# Patient Record
Sex: FEMALE | Race: WHITE | NOT HISPANIC OR LATINO | Employment: OTHER | URBAN - METROPOLITAN AREA
[De-identification: names, ages, dates, MRNs, and addresses within clinical notes are randomized per-mention and may not be internally consistent; named-entity substitution may affect disease eponyms.]

---

## 2023-09-06 PROBLEM — Z86.39 HISTORY OF LOW POTASSIUM: Status: ACTIVE | Noted: 2023-09-06

## 2023-09-06 PROBLEM — E03.9 HYPOTHYROIDISM: Status: ACTIVE | Noted: 2023-09-06

## 2023-09-06 PROBLEM — H04.129 TEAR FILM INSUFFICIENCY: Status: ACTIVE | Noted: 2023-09-06

## 2023-09-06 PROBLEM — D50.9 IRON DEFICIENCY ANEMIA: Status: ACTIVE | Noted: 2023-09-06

## 2023-09-06 PROBLEM — J45.20 MILD INTERMITTENT ASTHMA WITHOUT COMPLICATION (HHS-HCC): Status: ACTIVE | Noted: 2023-09-06

## 2023-09-06 PROBLEM — Z85.828 HISTORY OF BASAL CELL CARCINOMA: Status: ACTIVE | Noted: 2023-09-06

## 2023-09-06 PROBLEM — H25.10 NUCLEAR SENILE CATARACT: Status: ACTIVE | Noted: 2023-09-06

## 2023-09-06 PROBLEM — I20.89 ANGINA AT REST (CMS-HCC): Status: ACTIVE | Noted: 2023-09-06

## 2023-09-06 PROBLEM — Z86.0101 HISTORY OF ADENOMATOUS POLYP OF COLON: Status: ACTIVE | Noted: 2023-09-06

## 2023-09-06 PROBLEM — S09.90XA INJURY OF HEAD: Status: ACTIVE | Noted: 2023-09-06

## 2023-09-06 PROBLEM — J45.909 ASTHMA (HHS-HCC): Status: ACTIVE | Noted: 2023-09-06

## 2023-09-06 PROBLEM — G47.30 MILD SLEEP APNEA: Status: ACTIVE | Noted: 2023-09-06

## 2023-09-06 PROBLEM — H52.7 UNSPECIFIED DISORDER OF REFRACTION: Status: ACTIVE | Noted: 2023-09-06

## 2023-09-06 PROBLEM — S79.919A HIP INJURY: Status: ACTIVE | Noted: 2023-09-06

## 2023-09-06 PROBLEM — K14.8 TONGUE LESION: Status: ACTIVE | Noted: 2023-09-06

## 2023-09-06 PROBLEM — E04.1 NODULAR THYROID DISEASE: Status: ACTIVE | Noted: 2023-09-06

## 2023-09-06 PROBLEM — J33.9 NASAL POLYPS: Status: ACTIVE | Noted: 2023-09-06

## 2023-09-06 PROBLEM — F41.9 ANXIETY DISORDER, UNSPECIFIED: Status: ACTIVE | Noted: 2023-09-06

## 2023-09-06 PROBLEM — R40.0 DAYTIME SOMNOLENCE: Status: ACTIVE | Noted: 2023-09-06

## 2023-09-06 PROBLEM — M17.12 OSTEOARTHRITIS OF LEFT KNEE: Status: ACTIVE | Noted: 2023-09-06

## 2023-09-06 PROBLEM — Z86.010 HISTORY OF ADENOMATOUS POLYP OF COLON: Status: ACTIVE | Noted: 2023-09-06
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
## 2023-09-06 PROBLEM — E11.9 TYPE 2 DIABETES MELLITUS WITHOUT COMPLICATION, WITHOUT LONG-TERM CURRENT USE OF INSULIN (MULTI): Status: ACTIVE | Noted: 2023-09-06

## 2023-09-06 PROBLEM — R06.81 APNEIC EPISODE: Status: ACTIVE | Noted: 2023-09-06

## 2023-09-06 PROBLEM — G47.33 OBSTRUCTIVE SLEEP APNEA SYNDROME: Status: ACTIVE | Noted: 2023-09-06

## 2023-09-06 PROBLEM — E11.9 ABNORMAL METABOLIC STATE DUE TO DIABETES MELLITUS (MULTI): Status: ACTIVE | Noted: 2023-09-06

## 2023-09-06 PROBLEM — M62.838 MUSCLE SPASM: Status: ACTIVE | Noted: 2023-09-06

## 2023-09-06 PROBLEM — I10 HYPERTENSIVE DISORDER: Status: ACTIVE | Noted: 2023-09-06

## 2023-09-06 PROBLEM — K57.30 DIVERTICULOSIS LARGE INTESTINE W/O PERFORATION OR ABSCESS W/O BLEEDING: Status: ACTIVE | Noted: 2023-09-06

## 2023-09-06 PROBLEM — R06.83 LOUD SNORING: Status: ACTIVE | Noted: 2023-09-06

## 2023-09-06 PROBLEM — D62 ANEMIA DUE TO ACUTE BLOOD LOSS: Status: ACTIVE | Noted: 2023-09-06

## 2023-09-06 PROBLEM — S72.009A FRACTURE OF NECK OF FEMUR (MULTI): Status: ACTIVE | Noted: 2023-09-06

## 2023-09-06 PROBLEM — G62.9 NEUROPATHY: Status: ACTIVE | Noted: 2023-09-06

## 2023-09-06 PROBLEM — R00.2 PALPITATIONS: Status: ACTIVE | Noted: 2023-09-06

## 2023-09-06 PROBLEM — I50.9 HEART FAILURE (MULTI): Status: ACTIVE | Noted: 2023-09-06

## 2023-09-06 PROBLEM — F32.9 MAJOR DEPRESSIVE DISORDER, SINGLE EPISODE, UNSPECIFIED: Status: ACTIVE | Noted: 2023-09-06

## 2023-09-06 PROBLEM — E66.01 SEVERE OBESITY (BMI >= 40) (MULTI): Status: ACTIVE | Noted: 2023-09-06

## 2023-09-06 PROBLEM — H35.369 RETINAL DRUSEN: Status: ACTIVE | Noted: 2023-09-06

## 2023-09-06 PROBLEM — R04.0 EPISTAXIS: Status: ACTIVE | Noted: 2023-09-06

## 2023-09-06 PROBLEM — G89.29 OTHER CHRONIC PAIN: Status: ACTIVE | Noted: 2023-09-06

## 2023-09-06 PROBLEM — M25.519 SHOULDER PAIN: Status: ACTIVE | Noted: 2023-09-06

## 2023-09-06 PROBLEM — R49.0 HOARSENESS: Status: ACTIVE | Noted: 2023-09-06

## 2023-09-06 RX ORDER — ACETAMINOPHEN 325 MG/1
325 TABLET ORAL EVERY 4 HOURS
COMMUNITY

## 2023-09-06 RX ORDER — LANOLIN ALCOHOL/MO/W.PET/CERES
1000 CREAM (GRAM) TOPICAL DAILY
COMMUNITY
End: 2023-10-18 | Stop reason: ALTCHOICE

## 2023-09-06 RX ORDER — TORSEMIDE 100 MG/1
0.5 TABLET ORAL DAILY
COMMUNITY
End: 2024-01-12 | Stop reason: SDUPTHER

## 2023-09-06 RX ORDER — METOPROLOL SUCCINATE 25 MG/1
25 TABLET, EXTENDED RELEASE ORAL NIGHTLY
COMMUNITY
Start: 2020-08-11 | End: 2023-10-18 | Stop reason: ALTCHOICE

## 2023-09-06 RX ORDER — LEVOTHYROXINE SODIUM 150 UG/1
150 TABLET ORAL
COMMUNITY
End: 2024-03-05

## 2023-09-06 RX ORDER — GABAPENTIN 600 MG/1
600 TABLET ORAL 2 TIMES DAILY
COMMUNITY
End: 2024-05-16 | Stop reason: SDUPTHER

## 2023-09-06 RX ORDER — FLUTICASONE PROPIONATE 50 MCG
2 SPRAY, SUSPENSION (ML) NASAL DAILY
COMMUNITY
End: 2024-03-06

## 2023-09-06 RX ORDER — FLUTICASONE PROPIONATE 110 UG/1
AEROSOL, METERED RESPIRATORY (INHALATION)
COMMUNITY
End: 2023-10-18

## 2023-09-06 RX ORDER — ALBUTEROL SULFATE 90 UG/1
AEROSOL, METERED RESPIRATORY (INHALATION)
COMMUNITY
End: 2023-10-18

## 2023-09-06 RX ORDER — SPIRONOLACTONE 25 MG/1
0.5 TABLET ORAL 2 TIMES DAILY
COMMUNITY
End: 2024-04-11

## 2023-09-06 RX ORDER — ASPIRIN 325 MG
500 TABLET, DELAYED RELEASE (ENTERIC COATED) ORAL 2 TIMES DAILY
COMMUNITY
End: 2023-10-18 | Stop reason: ALTCHOICE

## 2023-09-06 RX ORDER — METFORMIN HYDROCHLORIDE 500 MG/1
500 TABLET ORAL DAILY
COMMUNITY
End: 2024-04-11

## 2023-09-06 RX ORDER — FLUOXETINE HYDROCHLORIDE 40 MG/1
40 CAPSULE ORAL DAILY
COMMUNITY
End: 2024-03-21

## 2023-09-06 RX ORDER — CHOLECALCIFEROL (VITAMIN D3) 25 MCG
25 TABLET ORAL DAILY
COMMUNITY

## 2023-09-06 RX ORDER — DICLOFENAC SODIUM 10 MG/G
GEL TOPICAL DAILY
COMMUNITY
End: 2024-01-12 | Stop reason: WASHOUT

## 2023-09-06 RX ORDER — BACLOFEN 20 MG
1 TABLET ORAL DAILY
COMMUNITY
End: 2024-01-12 | Stop reason: WASHOUT

## 2023-09-06 RX ORDER — PHENYLEPHRINE HCL 10 MG
TABLET ORAL
COMMUNITY
End: 2023-10-18 | Stop reason: ALTCHOICE

## 2023-10-16 DIAGNOSIS — J45.909 ASTHMA, UNSPECIFIED ASTHMA SEVERITY, UNSPECIFIED WHETHER COMPLICATED, UNSPECIFIED WHETHER PERSISTENT (HHS-HCC): ICD-10-CM

## 2023-10-18 ENCOUNTER — OFFICE VISIT (OUTPATIENT)
Dept: PRIMARY CARE | Facility: CLINIC | Age: 68
End: 2023-10-18
Payer: MEDICARE

## 2023-10-18 VITALS
RESPIRATION RATE: 18 BRPM | DIASTOLIC BLOOD PRESSURE: 68 MMHG | WEIGHT: 267 LBS | HEART RATE: 62 BPM | OXYGEN SATURATION: 99 % | SYSTOLIC BLOOD PRESSURE: 124 MMHG | BODY MASS INDEX: 44.48 KG/M2 | TEMPERATURE: 97.6 F | HEIGHT: 65 IN

## 2023-10-18 DIAGNOSIS — S90.852A FOREIGN BODY IN LEFT FOOT, INITIAL ENCOUNTER: Primary | ICD-10-CM

## 2023-10-18 PROCEDURE — 3044F HG A1C LEVEL LT 7.0%: CPT | Performed by: NURSE PRACTITIONER

## 2023-10-18 PROCEDURE — 1125F AMNT PAIN NOTED PAIN PRSNT: CPT | Performed by: NURSE PRACTITIONER

## 2023-10-18 PROCEDURE — 99213 OFFICE O/P EST LOW 20 MIN: CPT | Performed by: NURSE PRACTITIONER

## 2023-10-18 PROCEDURE — 1159F MED LIST DOCD IN RCRD: CPT | Performed by: NURSE PRACTITIONER

## 2023-10-18 PROCEDURE — 3074F SYST BP LT 130 MM HG: CPT | Performed by: NURSE PRACTITIONER

## 2023-10-18 PROCEDURE — 10120 INC&RMVL FB SUBQ TISS SMPL: CPT | Performed by: NURSE PRACTITIONER

## 2023-10-18 PROCEDURE — 1036F TOBACCO NON-USER: CPT | Performed by: NURSE PRACTITIONER

## 2023-10-18 PROCEDURE — 99213 OFFICE O/P EST LOW 20 MIN: CPT | Mod: 25 | Performed by: NURSE PRACTITIONER

## 2023-10-18 PROCEDURE — 3078F DIAST BP <80 MM HG: CPT | Performed by: NURSE PRACTITIONER

## 2023-10-18 RX ORDER — DEXAMETHASONE 4 MG/1
TABLET ORAL
Qty: 1 G | Refills: 5 | Status: SHIPPED | OUTPATIENT
Start: 2023-10-18 | End: 2024-04-16

## 2023-10-18 RX ORDER — ALBUTEROL SULFATE 90 UG/1
AEROSOL, METERED RESPIRATORY (INHALATION)
Qty: 1 G | Refills: 5 | Status: SHIPPED | OUTPATIENT
Start: 2023-10-18 | End: 2024-05-16 | Stop reason: SDUPTHER

## 2023-10-18 RX ORDER — AMOXICILLIN AND CLAVULANATE POTASSIUM 875; 125 MG/1; MG/1
1 TABLET, FILM COATED ORAL EVERY 12 HOURS
COMMUNITY
Start: 2023-10-11 | End: 2024-01-12 | Stop reason: WASHOUT

## 2023-10-18 RX ORDER — SULFAMETHOXAZOLE AND TRIMETHOPRIM 800; 160 MG/1; MG/1
1 TABLET ORAL 2 TIMES DAILY
Qty: 10 TABLET | Refills: 0 | Status: SHIPPED | OUTPATIENT
Start: 2023-10-18 | End: 2023-10-23

## 2023-10-18 ASSESSMENT — ENCOUNTER SYMPTOMS
RESPIRATORY NEGATIVE: 1
LOSS OF SENSATION IN FEET: 0
LOSS OF SENSATION: 0
OCCASIONAL FEELINGS OF UNSTEADINESS: 1
TINGLING: 0
CONSTITUTIONAL NEGATIVE: 1
NUMBNESS: 0
CARDIOVASCULAR NEGATIVE: 1
DEPRESSION: 0
INABILITY TO BEAR WEIGHT: 0

## 2023-10-18 ASSESSMENT — PAIN SCALES - GENERAL: PAINLEVEL: 2

## 2023-10-18 NOTE — TELEPHONE ENCOUNTER
Rx Request received.   Rx needed Ventolin and Flovent inhalers  Pharmacy Populated  Last appointment 6 month follow up 7/12/23. Has upcoming physical 1/12/24  PCP Dee Choudhary, BERTRAND OCHOA on 10/18/23 at 1:23 PM.

## 2023-10-18 NOTE — PROGRESS NOTES
"  Subjective   Patient ID: Radha Redmond is a 68 y.o. female who presents for Foot Injury (Sore on the bottom of left foot, not healing, some bleeding today./Pt thought it was a callus at first, noticed it early last week, has applied callus removing gel/oint, but soon after it started to ooze yellowish discharge mixed with blood. Pt is on amoxicillin from dentist due to tooth implant).    Foot Injury   The incident occurred 5 to 7 days ago. The incident occurred at home. There was no injury mechanism. The pain is present in the left foot. The quality of the pain is described as burning. The pain is at a severity of 2/10. The pain is mild. The pain has been Fluctuating since onset. Pertinent negatives include no inability to bear weight, loss of sensation, numbness or tingling. She reports no foreign bodies present. The symptoms are aggravated by weight bearing. Treatments tried: bandage and atb ointment. The treatment provided mild relief.        Review of Systems   Constitutional: Negative.    HENT: Negative.     Respiratory: Negative.     Cardiovascular: Negative.    Skin:         Open wound left palmar foot    Neurological:  Negative for tingling and numbness.       Objective   /68 (BP Location: Right arm, Patient Position: Sitting, BP Cuff Size: Adult)   Pulse 62   Temp 36.4 °C (97.6 °F) (Temporal)   Resp 18   Ht 1.638 m (5' 4.5\")   Wt 121 kg (267 lb)   SpO2 99%   BMI 45.12 kg/m²     Physical Exam  Vitals and nursing note reviewed.   Constitutional:       Appearance: Normal appearance.   Cardiovascular:      Rate and Rhythm: Normal rate and regular rhythm.   Pulmonary:      Effort: Pulmonary effort is normal.      Breath sounds: Normal breath sounds.   Skin:     Comments: Sore on dorsal aspect of left foot, with foreign body.  Area cleaned with betadine, and alcohol, small white glass shard removed with needle and grabber tweezers, pt tolerated well.  Recleaned area and applied bacitracin and " bandage.    Neurological:      Mental Status: She is alert.         Assessment/Plan   Diagnoses and all orders for this visit:  Foreign body in left foot, initial encounter  -     sulfamethoxazole-trimethoprim (Bactrim DS) 800-160 mg tablet; Take 1 tablet by mouth 2 times a day for 5 days.

## 2023-10-18 NOTE — PATIENT INSTRUCTIONS
Clean with soap and water twice a day, pat dry, apply neosporin and bandage.    Take antibiotic till gone

## 2024-01-02 ENCOUNTER — LAB (OUTPATIENT)
Dept: LAB | Facility: LAB | Age: 69
End: 2024-01-02
Payer: MEDICARE

## 2024-01-02 DIAGNOSIS — N18.30 CHRONIC KIDNEY DISEASE, STAGE 3 UNSPECIFIED (MULTI): Primary | ICD-10-CM

## 2024-01-02 LAB
ALBUMIN SERPL-MCNC: 4 G/DL (ref 3.5–5)
ANION GAP SERPL CALC-SCNC: 12 MMOL/L
BUN SERPL-MCNC: 25 MG/DL (ref 8–25)
CALCIUM SERPL-MCNC: 9 MG/DL (ref 8.5–10.4)
CHLORIDE SERPL-SCNC: 100 MMOL/L (ref 97–107)
CO2 SERPL-SCNC: 30 MMOL/L (ref 24–31)
CREAT SERPL-MCNC: 1.1 MG/DL (ref 0.4–1.6)
ERYTHROCYTE [DISTWIDTH] IN BLOOD BY AUTOMATED COUNT: 12.7 % (ref 11.5–14.5)
GFR SERPL CREATININE-BSD FRML MDRD: 55 ML/MIN/1.73M*2
GLUCOSE SERPL-MCNC: 117 MG/DL (ref 65–99)
HCT VFR BLD AUTO: 41 % (ref 36–46)
HGB BLD-MCNC: 13 G/DL (ref 12–16)
MCH RBC QN AUTO: 29.1 PG (ref 26–34)
MCHC RBC AUTO-ENTMCNC: 31.7 G/DL (ref 32–36)
MCV RBC AUTO: 92 FL (ref 80–100)
NRBC BLD-RTO: 0 /100 WBCS (ref 0–0)
PHOSPHATE SERPL-MCNC: 4.5 MG/DL (ref 2.5–4.5)
PLATELET # BLD AUTO: 331 X10*3/UL (ref 150–450)
POTASSIUM SERPL-SCNC: 4.2 MMOL/L (ref 3.4–5.1)
PTH-INTACT SERPL-MCNC: 111.6 PG/ML (ref 18.5–88)
RBC # BLD AUTO: 4.46 X10*6/UL (ref 4–5.2)
SODIUM SERPL-SCNC: 142 MMOL/L (ref 133–145)
WBC # BLD AUTO: 6 X10*3/UL (ref 4.4–11.3)

## 2024-01-02 PROCEDURE — 36415 COLL VENOUS BLD VENIPUNCTURE: CPT

## 2024-01-02 PROCEDURE — 83970 ASSAY OF PARATHORMONE: CPT

## 2024-01-02 PROCEDURE — 85027 COMPLETE CBC AUTOMATED: CPT

## 2024-01-02 PROCEDURE — 80069 RENAL FUNCTION PANEL: CPT

## 2024-01-12 ENCOUNTER — OFFICE VISIT (OUTPATIENT)
Dept: PRIMARY CARE | Facility: CLINIC | Age: 69
End: 2024-01-12
Payer: MEDICARE

## 2024-01-12 VITALS
SYSTOLIC BLOOD PRESSURE: 136 MMHG | HEART RATE: 68 BPM | OXYGEN SATURATION: 96 % | DIASTOLIC BLOOD PRESSURE: 62 MMHG | HEIGHT: 65 IN | TEMPERATURE: 96.6 F | WEIGHT: 268.7 LBS | RESPIRATION RATE: 18 BRPM | BODY MASS INDEX: 44.77 KG/M2

## 2024-01-12 DIAGNOSIS — Z00.00 ROUTINE GENERAL MEDICAL EXAMINATION AT HEALTH CARE FACILITY: Primary | ICD-10-CM

## 2024-01-12 DIAGNOSIS — Z12.31 ENCOUNTER FOR SCREENING MAMMOGRAM FOR BREAST CANCER: ICD-10-CM

## 2024-01-12 DIAGNOSIS — E66.01 MORBID OBESITY WITH BMI OF 45.0-49.9, ADULT (MULTI): ICD-10-CM

## 2024-01-12 DIAGNOSIS — E55.9 VITAMIN D DEFICIENCY: ICD-10-CM

## 2024-01-12 DIAGNOSIS — E11.9 TYPE 2 DIABETES MELLITUS WITHOUT COMPLICATION, WITHOUT LONG-TERM CURRENT USE OF INSULIN (MULTI): ICD-10-CM

## 2024-01-12 DIAGNOSIS — E04.1 NODULAR THYROID DISEASE: ICD-10-CM

## 2024-01-12 DIAGNOSIS — I10 BENIGN ESSENTIAL HTN: ICD-10-CM

## 2024-01-12 DIAGNOSIS — Z13.6 SCREENING FOR CARDIOVASCULAR CONDITION: ICD-10-CM

## 2024-01-12 PROBLEM — S79.919A HIP INJURY: Status: RESOLVED | Noted: 2023-09-06 | Resolved: 2024-01-12

## 2024-01-12 PROBLEM — R06.81 APNEIC EPISODE: Status: RESOLVED | Noted: 2023-09-06 | Resolved: 2024-01-12

## 2024-01-12 PROBLEM — J45.909 ASTHMA (HHS-HCC): Status: RESOLVED | Noted: 2023-09-06 | Resolved: 2024-01-12

## 2024-01-12 PROBLEM — G47.30 MILD SLEEP APNEA: Status: RESOLVED | Noted: 2023-09-06 | Resolved: 2024-01-12
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
## 2024-01-12 PROBLEM — S09.90XA INJURY OF HEAD: Status: RESOLVED | Noted: 2023-09-06 | Resolved: 2024-01-12

## 2024-01-12 PROBLEM — S72.009A FRACTURE OF NECK OF FEMUR (MULTI): Status: RESOLVED | Noted: 2023-09-06 | Resolved: 2024-01-12

## 2024-01-12 PROCEDURE — 1170F FXNL STATUS ASSESSED: CPT | Performed by: NURSE PRACTITIONER

## 2024-01-12 PROCEDURE — 1125F AMNT PAIN NOTED PAIN PRSNT: CPT | Performed by: NURSE PRACTITIONER

## 2024-01-12 PROCEDURE — 1157F ADVNC CARE PLAN IN RCRD: CPT | Performed by: NURSE PRACTITIONER

## 2024-01-12 PROCEDURE — 3075F SYST BP GE 130 - 139MM HG: CPT | Performed by: NURSE PRACTITIONER

## 2024-01-12 PROCEDURE — 1159F MED LIST DOCD IN RCRD: CPT | Performed by: NURSE PRACTITIONER

## 2024-01-12 PROCEDURE — 1036F TOBACCO NON-USER: CPT | Performed by: NURSE PRACTITIONER

## 2024-01-12 PROCEDURE — 3008F BODY MASS INDEX DOCD: CPT | Performed by: NURSE PRACTITIONER

## 2024-01-12 PROCEDURE — 3078F DIAST BP <80 MM HG: CPT | Performed by: NURSE PRACTITIONER

## 2024-01-12 PROCEDURE — G0439 PPPS, SUBSEQ VISIT: HCPCS | Performed by: NURSE PRACTITIONER

## 2024-01-12 PROCEDURE — 99215 OFFICE O/P EST HI 40 MIN: CPT | Performed by: NURSE PRACTITIONER

## 2024-01-12 RX ORDER — DAPAGLIFLOZIN 10 MG/1
10 TABLET, FILM COATED ORAL DAILY
COMMUNITY

## 2024-01-12 RX ORDER — TORSEMIDE 100 MG/1
50 TABLET ORAL DAILY
Qty: 45 TABLET | Refills: 1 | Status: SHIPPED | OUTPATIENT
Start: 2024-01-12 | End: 2024-07-10

## 2024-01-12 ASSESSMENT — ACTIVITIES OF DAILY LIVING (ADL)
USING TRANSPORTATION: INDEPENDENT
DRESSING YOURSELF: INDEPENDENT
GROCERY SHOPPING: INDEPENDENT
ADEQUATE_TO_COMPLETE_ADL: YES
EATING: INDEPENDENT
DRESSING: INDEPENDENT
PATIENT'S MEMORY ADEQUATE TO SAFELY COMPLETE DAILY ACTIVITIES?: YES
GROOMING: INDEPENDENT
NEEDS ASSISTANCE WITH FOOD: INDEPENDENT
JUDGMENT_ADEQUATE_SAFELY_COMPLETE_DAILY_ACTIVITIES: YES
FEEDING YOURSELF: INDEPENDENT
ADEQUATE_TO_COMPLETE_ADL: YES
WALKS IN HOME: NEEDS ASSISTANCE
FEEDING: INDEPENDENT
HEARING - LEFT EAR: FUNCTIONAL
DOING HOUSEWORK: INDEPENDENT
PILL BOX USED: YES
STIL DRIVING: NO
JUDGMENT_ADEQUATE_SAFELY_COMPLETE_DAILY_ACTIVITIES: YES
PREPARING MEALS: INDEPENDENT
TAKING MEDICATION: INDEPENDENT
MANAGING FINANCES: INDEPENDENT
BATHING: INDEPENDENT
HEARING - RIGHT EAR: FUNCTIONAL
USING TELEPHONE: INDEPENDENT
TOILETING: INDEPENDENT
BATHING: INDEPENDENT

## 2024-01-12 ASSESSMENT — ENCOUNTER SYMPTOMS
CARDIOVASCULAR NEGATIVE: 1
ENDOCRINE NEGATIVE: 1
EYES NEGATIVE: 1
MUSCULOSKELETAL NEGATIVE: 1
ALLERGIC/IMMUNOLOGIC NEGATIVE: 1
GASTROINTESTINAL NEGATIVE: 1
NEUROLOGICAL NEGATIVE: 1
RESPIRATORY NEGATIVE: 1
HEMATOLOGIC/LYMPHATIC NEGATIVE: 1
PSYCHIATRIC NEGATIVE: 1
CONSTITUTIONAL NEGATIVE: 1

## 2024-01-12 ASSESSMENT — PATIENT HEALTH QUESTIONNAIRE - PHQ9
2. FEELING DOWN, DEPRESSED OR HOPELESS: NOT AT ALL
SUM OF ALL RESPONSES TO PHQ9 QUESTIONS 1 AND 2: 0
1. LITTLE INTEREST OR PLEASURE IN DOING THINGS: NOT AT ALL
SUM OF ALL RESPONSES TO PHQ9 QUESTIONS 1 AND 2: 0
1. LITTLE INTEREST OR PLEASURE IN DOING THINGS: NOT AT ALL
2. FEELING DOWN, DEPRESSED OR HOPELESS: NOT AT ALL

## 2024-01-12 ASSESSMENT — PAIN SCALES - GENERAL: PAINLEVEL: 2

## 2024-01-12 NOTE — PROGRESS NOTES
"History Of Present Illness  Radha Redmond is a 68 y.o. female presenting for \"Annual Exam (Pt wearing a mask thinks its either allergies or she might be coming down with something).\"    HPI 68 year old female, here today for a physical and labs.  She went to a nephrologist about her kidneys.  Was started on farxiga to see if it helps A1C is good today     Past Medical History  Patient Active Problem List    Diagnosis Date Noted    Unspecified disorder of refraction 09/06/2023    Type 2 diabetes mellitus without complication, without long-term current use of insulin (CMS/[facility]) 09/06/2023    Tear film insufficiency 09/06/2023    Shoulder pain 09/06/2023    Retinal drusen 09/06/2023    Palpitations 09/06/2023    Other chronic pain 09/06/2023    Osteoarthritis of left knee 09/06/2023    Nuclear senile cataract 09/06/2023    Neuropathy 09/06/2023    Tongue lesion 09/06/2023    Nasal polyps 09/06/2023    Muscle spasm 09/06/2023    Severe obesity (BMI >= 40) (CMS/[facility]) 09/06/2023    Mild intermittent asthma without complication 09/06/2023    Major depressive disorder, single episode, unspecified 09/06/2023    Loud snoring 09/06/2023    Iron deficiency anemia 09/06/2023    Nodular thyroid disease 09/06/2023    Hypothyroidism 09/06/2023    Hoarseness 09/06/2023    History of basal cell carcinoma 09/06/2023    History of low potassium 09/06/2023    History of adenomatous polyp of colon 09/06/2023    Heart failure (CMS/[facility]) 09/06/2023    Hypertensive disorder 09/06/2023    Epistaxis 09/06/2023    Diverticulosis large intestine w/o perforation or abscess w/o bleeding 09/06/2023    Daytime somnolence 09/06/2023    Anxiety disorder, unspecified 09/06/2023    Angina at rest 09/06/2023    Anemia due to acute blood loss 09/06/2023    Obstructive sleep apnea syndrome 09/06/2023    Abnormal metabolic state due to diabetes mellitus (CMS/[facility]) 09/06/2023        Medications  Current Outpatient Medications   Medication Instructions    " acetaminophen (TYLENOL) 325 mg, oral, Every 4 hours    albuterol (Ventolin HFA) 90 mcg/actuation inhaler 2 PUFFS AS NEEDED INHALATION EVERY 4 HRS 30 DAY(S)    cholecalciferol (VITAMIN D-3) 25 mcg, oral, Daily    dapagliflozin propanediol (FARXIGA) 10 mg, oral, Daily    FLUoxetine (PROZAC) 40 mg, oral, Daily    fluticasone (Flonase) 50 mcg/actuation nasal spray 2 sprays, Each Nostril, Daily    fluticasone (Flovent HFA) 110 mcg/actuation inhaler INHALE 1 PUFF INHALATION TWICE A DAY    gabapentin (NEURONTIN) 600 mg, oral, 2 times daily    levothyroxine (SYNTHROID, LEVOXYL) 150 mcg, oral, Daily before breakfast    metFORMIN (GLUCOPHAGE) 500 mg, oral, Daily    multivit-minerals/folic acid (WOMEN'S MULTIVITAMIN GUMMIES ORAL) oral    spironolactone (Aldactone) 25 mg tablet 0.5 tablets, oral, 2 times daily    torsemide (DEMADEX) 50 mg, oral, Daily        Surgical History  She has a past surgical history that includes Other surgical history (01/27/2021); Other surgical history (01/27/2021); Other surgical history (01/27/2021); Other surgical history (01/27/2021); Other surgical history (07/12/2021); Other surgical history (07/12/2021); and Other surgical history (07/12/2021).     Social History  She reports that she quit smoking about 36 years ago. Her smoking use included cigarettes. She smoked an average of .5 packs per day. She has never been exposed to tobacco smoke. She has never used smokeless tobacco. She reports current alcohol use of about 1.0 standard drink of alcohol per week. She reports that she does not use drugs.    Family History  Family History   Problem Relation Name Age of Onset    Diabetes type II Mother      Prostate cancer Father      Arthritis Father      Stroke Son      Aneurysm Other children         Allergies  Aspirin, Nsaids (non-steroidal anti-inflammatory drug), Statins-hmg-coa reductase inhibitors, and Ezetimibe    ROS  Review of Systems   Constitutional: Negative.    HENT: Negative.     Eyes:  Negative.    Respiratory: Negative.     Cardiovascular: Negative.    Gastrointestinal: Negative.    Endocrine: Negative.    Genitourinary: Negative.    Musculoskeletal: Negative.    Skin: Negative.    Allergic/Immunologic: Negative.    Neurological: Negative.    Hematological: Negative.    Psychiatric/Behavioral: Negative.          Last Recorded Vitals  /62 (BP Location: Right arm, Patient Position: Sitting, BP Cuff Size: Adult)   Pulse 68   Temp 35.9 °C (96.6 °F)   Resp 18   Wt 122 kg (268 lb 11.2 oz)   SpO2 96%     Physical Exam  Vitals and nursing note reviewed.   Constitutional:       Appearance: Normal appearance.   HENT:      Head: Normocephalic and atraumatic.      Right Ear: Tympanic membrane, ear canal and external ear normal.      Left Ear: Tympanic membrane, ear canal and external ear normal.      Nose: Nose normal.      Mouth/Throat:      Mouth: Mucous membranes are moist.      Pharynx: Oropharynx is clear.   Eyes:      Extraocular Movements: Extraocular movements intact.      Conjunctiva/sclera: Conjunctivae normal.      Pupils: Pupils are equal, round, and reactive to light.   Neck:      Thyroid: No thyromegaly.   Cardiovascular:      Rate and Rhythm: Normal rate and regular rhythm.      Pulses: Normal pulses.      Heart sounds: Normal heart sounds, S1 normal and S2 normal.   Pulmonary:      Effort: Pulmonary effort is normal.      Breath sounds: Normal breath sounds. No wheezing or rhonchi.   Abdominal:      General: Bowel sounds are normal.      Palpations: Abdomen is soft. There is no mass.      Tenderness: There is no abdominal tenderness. There is no guarding.   Genitourinary:     Comments: Not examined  Musculoskeletal:         General: Normal range of motion.      Cervical back: Normal range of motion.      Right lower leg: No edema.      Left lower leg: No edema.   Lymphadenopathy:      Cervical: No cervical adenopathy.   Skin:     General: Skin is warm and dry.      Capillary  Refill: Capillary refill takes less than 2 seconds.      Findings: No rash.   Neurological:      General: No focal deficit present.      Mental Status: She is alert and oriented to person, place, and time. Mental status is at baseline.      Cranial Nerves: Cranial nerves 2-12 are intact. No cranial nerve deficit.      Sensory: Sensation is intact.      Motor: Motor function is intact.      Coordination: Coordination is intact.      Gait: Gait is intact.   Psychiatric:         Mood and Affect: Mood normal.         Behavior: Behavior normal.         Thought Content: Thought content normal.         Judgment: Judgment normal.       Relevant Results      Assessment/Plan   Radha was seen today for annual exam.  Diagnoses and all orders for this visit:  Routine general medical examination at health care facility (Primary)  Morbid obesity with BMI of 45.0-49.9, adult (CMS/[facility])  Screening for cardiovascular condition  -     Lipid panel; Future  Encounter for screening mammogram for breast cancer  -     BI mammo bilateral screening tomosynthesis; Future  Benign essential HTN  -     torsemide (Demadex) 100 mg tablet; Take 0.5 tablets (50 mg) by mouth once daily.  Type 2 diabetes mellitus without complication, without long-term current use of insulin (CMS/[facility])  -     Comprehensive Metabolic Panel; Future  Nodular thyroid disease  -     TSH with reflex to Free T4 if abnormal; Future      Medicare wellness questionnaire reviewed in detail. Advanced Directives reviewed today. Patient advised to provide the office with a copy if has not already done so. No problems with activities of daily living. Falls risks reviewed.     Dee Choudhary, APRN-CNP

## 2024-01-15 ENCOUNTER — LAB (OUTPATIENT)
Dept: LAB | Facility: LAB | Age: 69
End: 2024-01-15
Payer: MEDICARE

## 2024-01-15 DIAGNOSIS — N18.30 CHRONIC KIDNEY DISEASE, STAGE 3 UNSPECIFIED (MULTI): Primary | ICD-10-CM

## 2024-01-15 DIAGNOSIS — E11.9 TYPE 2 DIABETES MELLITUS WITHOUT COMPLICATION, WITHOUT LONG-TERM CURRENT USE OF INSULIN (MULTI): ICD-10-CM

## 2024-01-15 DIAGNOSIS — E55.9 VITAMIN D DEFICIENCY: ICD-10-CM

## 2024-01-15 DIAGNOSIS — Z13.6 SCREENING FOR CARDIOVASCULAR CONDITION: ICD-10-CM

## 2024-01-15 DIAGNOSIS — E04.1 NODULAR THYROID DISEASE: ICD-10-CM

## 2024-01-15 LAB
ALBUMIN SERPL-MCNC: 3.9 G/DL (ref 3.5–5)
ALP BLD-CCNC: 88 U/L (ref 35–125)
ALT SERPL-CCNC: 11 U/L (ref 5–40)
ANION GAP SERPL CALC-SCNC: 11 MMOL/L
AST SERPL-CCNC: 14 U/L (ref 5–40)
BILIRUB SERPL-MCNC: 0.6 MG/DL (ref 0.1–1.2)
BUN SERPL-MCNC: 22 MG/DL (ref 8–25)
CALCIUM SERPL-MCNC: 8.5 MG/DL (ref 8.5–10.4)
CHLORIDE SERPL-SCNC: 100 MMOL/L (ref 97–107)
CHOLEST SERPL-MCNC: 218 MG/DL (ref 133–200)
CHOLEST/HDLC SERPL: 4.4 {RATIO}
CO2 SERPL-SCNC: 34 MMOL/L (ref 24–31)
CREAT SERPL-MCNC: 1.2 MG/DL (ref 0.4–1.6)
EGFRCR SERPLBLD CKD-EPI 2021: 49 ML/MIN/1.73M*2
GLUCOSE SERPL-MCNC: 140 MG/DL (ref 65–99)
HDLC SERPL-MCNC: 49 MG/DL
LDLC SERPL CALC-MCNC: 130 MG/DL (ref 65–130)
PHOSPHATE SERPL-MCNC: 3.7 MG/DL (ref 2.5–4.5)
POTASSIUM SERPL-SCNC: 4.7 MMOL/L (ref 3.4–5.1)
PROT SERPL-MCNC: 6.5 G/DL (ref 5.9–7.9)
SODIUM SERPL-SCNC: 145 MMOL/L (ref 133–145)
TRIGL SERPL-MCNC: 194 MG/DL (ref 40–150)
TSH SERPL DL<=0.05 MIU/L-ACNC: 0.83 MIU/L (ref 0.27–4.2)

## 2024-01-15 PROCEDURE — 36415 COLL VENOUS BLD VENIPUNCTURE: CPT

## 2024-01-15 PROCEDURE — 80061 LIPID PANEL: CPT

## 2024-01-15 PROCEDURE — 80053 COMPREHEN METABOLIC PANEL: CPT

## 2024-01-15 PROCEDURE — 84443 ASSAY THYROID STIM HORMONE: CPT

## 2024-01-15 PROCEDURE — 84100 ASSAY OF PHOSPHORUS: CPT

## 2024-01-15 PROCEDURE — 82652 VIT D 1 25-DIHYDROXY: CPT

## 2024-01-18 LAB — 1,25(OH)2D3 SERPL-MCNC: 43.8 PG/ML (ref 19.9–79.3)

## 2024-02-27 DIAGNOSIS — N18.30 CHRONIC KIDNEY DISEASE, STAGE 3 UNSPECIFIED (MULTI): Primary | ICD-10-CM

## 2024-02-29 ENCOUNTER — HOSPITAL ENCOUNTER (OUTPATIENT)
Dept: RADIOLOGY | Facility: EXTERNAL LOCATION | Age: 69
Discharge: HOME | End: 2024-02-29

## 2024-02-29 ENCOUNTER — HOSPITAL ENCOUNTER (OUTPATIENT)
Dept: RADIOLOGY | Facility: HOSPITAL | Age: 69
Discharge: HOME | End: 2024-02-29
Payer: MEDICARE

## 2024-02-29 VITALS — WEIGHT: 275 LBS | HEIGHT: 64 IN | BODY MASS INDEX: 46.95 KG/M2

## 2024-02-29 DIAGNOSIS — Z12.31 ENCOUNTER FOR SCREENING MAMMOGRAM FOR BREAST CANCER: ICD-10-CM

## 2024-02-29 PROCEDURE — 77063 BREAST TOMOSYNTHESIS BI: CPT | Performed by: RADIOLOGY

## 2024-02-29 PROCEDURE — 77067 SCR MAMMO BI INCL CAD: CPT | Performed by: RADIOLOGY

## 2024-02-29 PROCEDURE — 77067 SCR MAMMO BI INCL CAD: CPT

## 2024-03-05 ENCOUNTER — LAB (OUTPATIENT)
Dept: LAB | Facility: LAB | Age: 69
End: 2024-03-05
Payer: MEDICARE

## 2024-03-05 DIAGNOSIS — N18.30 CHRONIC KIDNEY DISEASE, STAGE 3 UNSPECIFIED (MULTI): ICD-10-CM

## 2024-03-05 DIAGNOSIS — I10 ESSENTIAL (PRIMARY) HYPERTENSION: ICD-10-CM

## 2024-03-05 LAB
ALBUMIN SERPL-MCNC: 4 G/DL (ref 3.5–5)
ANION GAP SERPL CALC-SCNC: 12 MMOL/L
BUN SERPL-MCNC: 23 MG/DL (ref 8–25)
CALCIUM SERPL-MCNC: 9 MG/DL (ref 8.5–10.4)
CHLORIDE SERPL-SCNC: 101 MMOL/L (ref 97–107)
CO2 SERPL-SCNC: 28 MMOL/L (ref 24–31)
CREAT SERPL-MCNC: 1 MG/DL (ref 0.4–1.6)
EGFRCR SERPLBLD CKD-EPI 2021: 61 ML/MIN/1.73M*2
GLUCOSE SERPL-MCNC: 124 MG/DL (ref 65–99)
PHOSPHATE SERPL-MCNC: 4 MG/DL (ref 2.5–4.5)
POTASSIUM SERPL-SCNC: 4.6 MMOL/L (ref 3.4–5.1)
SODIUM SERPL-SCNC: 141 MMOL/L (ref 133–145)

## 2024-03-05 PROCEDURE — 80069 RENAL FUNCTION PANEL: CPT

## 2024-03-05 PROCEDURE — 36415 COLL VENOUS BLD VENIPUNCTURE: CPT

## 2024-03-05 RX ORDER — LEVOTHYROXINE SODIUM 150 UG/1
150 TABLET ORAL DAILY
Qty: 90 TABLET | Refills: 1 | Status: SHIPPED | OUTPATIENT
Start: 2024-03-05

## 2024-03-05 NOTE — TELEPHONE ENCOUNTER
Rx populated. Last office visit on 1/12/214 for annual exam.. pt is scheduled for a 6 mo follow up on 7/12/14

## 2024-03-05 NOTE — ADDENDUM NOTE
Encounter addended by: Nohemi Fox on: 3/5/2024 11:18 AM   Actions taken: Image imported, Imaging Exam ended

## 2024-03-06 DIAGNOSIS — I10 ESSENTIAL (PRIMARY) HYPERTENSION: ICD-10-CM

## 2024-03-06 RX ORDER — FLUTICASONE PROPIONATE 50 MCG
SPRAY, SUSPENSION (ML) NASAL
Qty: 48 ML | Refills: 1 | Status: SHIPPED | OUTPATIENT
Start: 2024-03-06

## 2024-03-16 DIAGNOSIS — Z76.0 MEDICATION REFILL: ICD-10-CM

## 2024-03-21 RX ORDER — FLUOXETINE HYDROCHLORIDE 40 MG/1
40 CAPSULE ORAL DAILY
Qty: 90 CAPSULE | Refills: 1 | Status: SHIPPED | OUTPATIENT
Start: 2024-03-21 | End: 2024-09-17

## 2024-03-21 NOTE — TELEPHONE ENCOUNTER
Rx request received  Pharmacy populated  Last appmt 1/12/24 FOR MEDICARE WELLNESS EXAM. HAS APPMT SCHEDULED FOR 7/2024

## 2024-04-11 DIAGNOSIS — E11.9 TYPE 2 DIABETES MELLITUS WITHOUT COMPLICATION, WITHOUT LONG-TERM CURRENT USE OF INSULIN (MULTI): ICD-10-CM

## 2024-04-11 DIAGNOSIS — Z76.0 MEDICATION REFILL: ICD-10-CM

## 2024-04-11 RX ORDER — SPIRONOLACTONE 25 MG/1
TABLET ORAL 2 TIMES DAILY
Qty: 90 TABLET | Refills: 1 | Status: SHIPPED | OUTPATIENT
Start: 2024-04-11

## 2024-04-11 RX ORDER — METFORMIN HYDROCHLORIDE 500 MG/1
TABLET ORAL
Qty: 180 TABLET | Refills: 1 | Status: SHIPPED | OUTPATIENT
Start: 2024-04-11

## 2024-04-16 DIAGNOSIS — J45.909 ASTHMA, UNSPECIFIED ASTHMA SEVERITY, UNSPECIFIED WHETHER COMPLICATED, UNSPECIFIED WHETHER PERSISTENT (HHS-HCC): ICD-10-CM

## 2024-04-16 RX ORDER — FLUTICASONE FUROATE 100 UG/1
1 POWDER RESPIRATORY (INHALATION) DAILY
Qty: 14 EACH | Refills: 0 | Status: SHIPPED | OUTPATIENT
Start: 2024-04-16

## 2024-04-26 ENCOUNTER — OFFICE VISIT (OUTPATIENT)
Dept: OPHTHALMOLOGY | Facility: CLINIC | Age: 69
End: 2024-04-26
Payer: MEDICARE

## 2024-04-26 ENCOUNTER — CLINICAL SUPPORT (OUTPATIENT)
Dept: OPHTHALMOLOGY | Facility: CLINIC | Age: 69
End: 2024-04-26
Payer: MEDICARE

## 2024-04-26 ENCOUNTER — APPOINTMENT (OUTPATIENT)
Dept: OPHTHALMOLOGY | Facility: CLINIC | Age: 69
End: 2024-04-26
Payer: MEDICARE

## 2024-04-26 DIAGNOSIS — H35.363 DRUSEN (DEGENERATIVE) OF MACULA, BILATERAL: Primary | ICD-10-CM

## 2024-04-26 DIAGNOSIS — H04.123 INSUFFICIENCY OF TEAR FILM OF BOTH EYES: ICD-10-CM

## 2024-04-26 DIAGNOSIS — E11.9 TYPE 2 DIABETES MELLITUS WITHOUT COMPLICATION, WITHOUT LONG-TERM CURRENT USE OF INSULIN (MULTI): ICD-10-CM

## 2024-04-26 DIAGNOSIS — H25.813 COMBINED FORMS OF AGE-RELATED CATARACT OF BOTH EYES: ICD-10-CM

## 2024-04-26 DIAGNOSIS — H52.7 UNSPECIFIED DISORDER OF REFRACTION: ICD-10-CM

## 2024-04-26 PROBLEM — H35.369 RETINAL DRUSEN: Status: RESOLVED | Noted: 2023-09-06 | Resolved: 2024-04-26

## 2024-04-26 PROCEDURE — 99214 OFFICE O/P EST MOD 30 MIN: CPT | Performed by: OPHTHALMOLOGY

## 2024-04-26 PROCEDURE — 92015 DETERMINE REFRACTIVE STATE: CPT | Performed by: OPHTHALMOLOGY

## 2024-04-26 PROCEDURE — 92134 CPTRZ OPH DX IMG PST SGM RTA: CPT | Performed by: OPHTHALMOLOGY

## 2024-04-26 RX ORDER — EMPAGLIFLOZIN 10 MG/1
10 TABLET, FILM COATED ORAL DAILY
COMMUNITY

## 2024-04-26 ASSESSMENT — REFRACTION_MANIFEST
OD_SPHERE: +1.50
OS_AXIS: 080
METHOD_AUTOREFRACTION: 1
OS_CYLINDER: -0.25
OS_SPHERE: +0.75
OD_CYLINDER: -0.25
OD_AXIS: 095

## 2024-04-26 ASSESSMENT — CUP TO DISC RATIO
OS_RATIO: 0.2
OD_RATIO: 0.2

## 2024-04-26 ASSESSMENT — KERATOMETRY
OS_AXISANGLE2_DEGREES: 180
OS_K2POWER_DIOPTERS: 43.00
OD_AXISANGLE2_DEGREES: 180
OS_AXISANGLE_DEGREES: 90
OD_K2POWER_DIOPTERS: 43.00
OD_AXISANGLE_DEGREES: 90
OD_K1POWER_DIOPTERS: 41.75
METHOD_AUTO_MANUAL: AUTOMATED
OS_K1POWER_DIOPTERS: 42.25

## 2024-04-26 ASSESSMENT — ENCOUNTER SYMPTOMS
DEPRESSION: 0
RESPIRATORY NEGATIVE: 0
MUSCULOSKELETAL NEGATIVE: 0
LOSS OF SENSATION IN FEET: 0
PSYCHIATRIC NEGATIVE: 0
OCCASIONAL FEELINGS OF UNSTEADINESS: 0
ENDOCRINE NEGATIVE: 0
NEUROLOGICAL NEGATIVE: 0
CARDIOVASCULAR NEGATIVE: 0
EYES NEGATIVE: 0
GASTROINTESTINAL NEGATIVE: 0
CONSTITUTIONAL NEGATIVE: 0
ALLERGIC/IMMUNOLOGIC NEGATIVE: 0
HEMATOLOGIC/LYMPHATIC NEGATIVE: 0

## 2024-04-26 ASSESSMENT — REFRACTION_WEARINGRX
SPECS_TYPE: READERS
OS_AXIS: 078
OS_CYLINDER: -0.50
OD_SPHERE: +3.00
OS_SPHERE: +2.50

## 2024-04-26 ASSESSMENT — PATIENT HEALTH QUESTIONNAIRE - PHQ9
1. LITTLE INTEREST OR PLEASURE IN DOING THINGS: NOT AT ALL
2. FEELING DOWN, DEPRESSED OR HOPELESS: NOT AT ALL
SUM OF ALL RESPONSES TO PHQ9 QUESTIONS 1 AND 2: 0

## 2024-04-26 ASSESSMENT — VISUAL ACUITY
OD_SC+: -1
OD_SC: 20/60
OS_SC: 20/25
METHOD: SNELLEN - SINGLE
OD_PH_SC: 20/25
OD_PH_SC+: +1

## 2024-04-26 ASSESSMENT — EXTERNAL EXAM - LEFT EYE: OS_EXAM: BROW PTOSIS

## 2024-04-26 ASSESSMENT — TONOMETRY
OD_IOP_MMHG: 17
OS_IOP_MMHG: 17
IOP_METHOD: GOLDMANN APPLANATION

## 2024-04-26 ASSESSMENT — SLIT LAMP EXAM - LIDS
COMMENTS: 1+ BLEPHARITIS, 1+ DERMATOCHALASIS - UPPER LID
COMMENTS: 1+ BLEPHARITIS, 1+ DERMATOCHALASIS - UPPER LID

## 2024-04-26 ASSESSMENT — EXTERNAL EXAM - RIGHT EYE: OD_EXAM: BROW PTOSIS

## 2024-04-26 ASSESSMENT — PAIN SCALES - GENERAL: PAINLEVEL: 0-NO PAIN

## 2024-04-26 NOTE — PROGRESS NOTES
"Subjective   Patient ID: Radha Redmond is a 69 y.o. female.    Chief Complaint    Annual Exam       HPI    States OD has been a little \"sticky\" for past few days, no lubrication or compresses    Complete, macular, and diabetic dilated exam.  Now on jardiance.  Vision is a bit blurry OD but not wearing any specs, just for reading.  Dry eyes, especially OD, but does not use any art tears.  No other new problems or complaints.    Last edited by Yosef Figueroa MD on 4/26/2024  2:10 PM.        No current outpatient medications on file. (Ophthalmology pharm classes)       Current Outpatient Medications (Other)   Medication Sig Dispense Refill    acetaminophen (TylenoL) 325 mg tablet Take 1 tablet (325 mg) by mouth every 4 hours.      cholecalciferol (Vitamin D-3) 25 MCG (1000 UT) tablet Take 1 tablet (25 mcg) by mouth once daily.      FLUoxetine (PROzac) 40 mg capsule TAKE 1 CAPSULE BY MOUTH EVERY DAY FOR 90 DAYS 90 capsule 1    fluticasone (Flonase) 50 mcg/actuation nasal spray INSTILL 2 SPRAYS IN EACH NOSTRIL EVERY DAY PLEASE MAKE AN APPOINTMENT WITH DR. BAER NASALLY DAILY 30 DAYS 48 mL 1    fluticasone furoate (Arnuity Ellipta) 100 mcg/actuation inhaler Inhale 1 puff once daily. 14 each 0    gabapentin (Neurontin) 600 mg tablet Take 1 tablet (600 mg) by mouth 2 times a day.      Jardiance 10 mg Take 1 tablet (10 mg) by mouth once daily.      levothyroxine (Synthroid, Levoxyl) 150 mcg tablet TAKE 1 TABLET BY MOUTH EVERY DAY 90 tablet 1    metFORMIN (Glucophage) 500 mg tablet TAKE 1 TABLET BY MOUTH TWICE A DAY WITH MEALS FOR 90 DAYS 180 tablet 1    multivit-minerals/folic acid (WOMEN'S MULTIVITAMIN GUMMIES ORAL) Take by mouth.      spironolactone (Aldactone) 25 mg tablet TAKE 1/2 TABLET BY MOUTH TWICE A DAY 90 tablet 1    torsemide (Demadex) 100 mg tablet Take 0.5 tablets (50 mg) by mouth once daily. 45 tablet 1    albuterol (Ventolin HFA) 90 mcg/actuation inhaler 2 PUFFS AS NEEDED INHALATION EVERY 4 HRS 30 DAY(S) " (Patient not taking: Reported on 4/26/2024) 1 g 5    apixaban (Eliquis) 2.5 mg tablet Take 1 tablet (2.5 mg) by mouth 2 times a day.      dapagliflozin propanediol (Farxiga) 10 mg Take 1 tablet (10 mg) by mouth once daily.         Objective   Base Eye Exam       Visual Acuity (Snellen - Single)         Right Left Both    Dist sc 20/60 -1 20/25     Dist ph sc 20/25 +1      Near cc   J1              Tonometry (Goldmann Applanation, 1:40 PM)         Right Left    Pressure 17 17              Pupils         Dark Shape React APD    Right 4 Round 1 None    Left 4 Round 1 None              Dilation       Both eyes: 1% Tropic 2.5% Phen @ 135 PM                  Additional Tests       Keratometry (Automated)         K1 Axis K2 Axis    Right 41.75 180 43.00 90    Left 42.25 180 43.00 90                  Slit Lamp and Fundus Exam       External Exam         Right Left    External Brow ptosis Brow ptosis              Slit Lamp Exam         Right Left    Lids/Lashes 1+ Blepharitis, 1+ Dermatochalasis - upper lid 1+ Blepharitis, 1+ Dermatochalasis - upper lid    Conjunctiva/Sclera normal bulbar and palepbral conjunctiva normal bulbar and palepbral conjunctiva    Cornea normal epi/stroma/endo and tear film normal epi/stroma/endo and tear film    Anterior Chamber ant. chamber deep and quiet ant. chamber deep and quiet    Iris iris normal iris normal    Lens 1+ Nuclear sclerosis, 2+ Cortical cataract 1+ Nuclear sclerosis, 1+ Cortical cataract    Anterior Vitreous vitreous clear and normal vitreous clear and normal              Fundus Exam         Right Left    Disc optic nerve is pink with good rim optic nerve is pink with good rim    C/D Ratio 0.2 0.2    Macula No background diabetic retinopathy No background diabetic retinopathy    Vessels normal retinal vessels normal retinal vessels    Periphery normal retinal periphery normal retinal periphery                  Refraction       Wearing Rx         Sphere Cylinder Axis    Right  +3.00      Left +2.50 -0.50 078      Type: readers              Manifest Refraction (Auto)         Sphere Cylinder Axis    Right +1.50 -0.25 095    Left +0.75 -0.25 080      Pupillary Distance: 65              Final Rx         Sphere Dist VA Add Near VA    Right +1.00 20/30 +2.50 J1+    Left +0.75 20/20 +2.50 J1+      Type: progressive    Expiration Date: 4/26/2026    Pupillary Distance: 65                    Assessment/Plan   Problem List Items Addressed This Visit          Eye/Vision problems    Unspecified disorder of refraction    Type 2 diabetes mellitus without complication, without long-term current use of insulin (Multi)     F/u one year full with oct mac.          Tear film insufficiency    Combined forms of age-related cataract of both eyes    Drusen (degenerative) of macula, bilateral - Primary    Relevant Orders    OCT, Retina - OU - Both Eyes (Completed)

## 2024-04-26 NOTE — PATIENT INSTRUCTIONS
Use artificial tears daily.  New glasses prescription given.  Follow up in one year for a full exam.

## 2024-04-26 NOTE — LETTER
April 26, 2024    Dee Choudhary, APRN-CNP  [address]  [address]  [facility] [place] [address]     Patient: Radha Redmond   YOB: 1955   Date of Visit: 4/26/2024       Dear Dr. Dee Choudhary, APRN-CNP:    Thank you for referring Radha Redmond to me for evaluation. Here is my assessment and plan of care:    Assessment/Plan:  Radha was seen today for annual exam.  Diagnoses and all orders for this visit:  Drusen (degenerative) of macula, bilateral (Primary)  -     OCT, Retina - OU - Both Eyes  Type 2 diabetes mellitus without complication, without long-term current use of insulin (Multi)  Combined forms of age-related cataract of both eyes  Insufficiency of tear film of both eyes  Unspecified disorder of refraction    Below you will find my full exam findings. If you have questions, please do not hesitate to call me. I look forward to following Radha along with you.     No signs of background diabetic retinopathy (BDR) OU.  Follow up in one year for a full exam.      Sincerely,        Yosef Figueroa MD        CC:   No Recipients        Base Eye Exam       Visual Acuity (Snellen - Single)         Right Left Both    Dist sc 20/60 -1 20/25     Dist ph sc 20/25 +1      Near cc   J1              Tonometry (Goldmann Applanation, 1:40 PM)         Right Left    Pressure 17 17              Pupils         Dark Shape React APD    Right 4 Round 1 None    Left 4 Round 1 None              Dilation       Both eyes: 1% Tropic 2.5% Phen @ 135 PM                  Additional Tests       Keratometry (Automated)         K1 Axis K2 Axis    Right 41.75 180 43.00 90    Left 42.25 180 43.00 90                  Slit Lamp and Fundus Exam       External Exam         Right Left    External Brow ptosis Brow ptosis              Slit Lamp Exam         Right Left    Lids/Lashes 1+ Blepharitis, 1+ Dermatochalasis - upper lid 1+ Blepharitis, 1+ Dermatochalasis - upper lid    Conjunctiva/Sclera normal bulbar and palepbral  conjunctiva normal bulbar and palepbral conjunctiva    Cornea normal epi/stroma/endo and tear film normal epi/stroma/endo and tear film    Anterior Chamber ant. chamber deep and quiet ant. chamber deep and quiet    Iris iris normal iris normal    Lens 1+ Nuclear sclerosis, 2+ Cortical cataract 1+ Nuclear sclerosis, 1+ Cortical cataract    Anterior Vitreous vitreous clear and normal vitreous clear and normal              Fundus Exam         Right Left    Disc optic nerve is pink with good rim optic nerve is pink with good rim    C/D Ratio 0.2 0.2    Macula No background diabetic retinopathy No background diabetic retinopathy    Vessels normal retinal vessels normal retinal vessels    Periphery normal retinal periphery normal retinal periphery                  Refraction       Wearing Rx         Sphere Cylinder Axis    Right +3.00      Left +2.50 -0.50 078      Type: readers              Manifest Refraction (Auto)         Sphere Cylinder Axis    Right +1.50 -0.25 095    Left +0.75 -0.25 080      Pupillary Distance: 65              Final Rx         Sphere Dist VA Add Near VA    Right +1.00 20/30 +2.50 J1+    Left +0.75 20/20 +2.50 J1+      Type: progressive    Expiration Date: 4/26/2026    Pupillary Distance: 65

## 2024-05-16 DIAGNOSIS — J45.909 ASTHMA, UNSPECIFIED ASTHMA SEVERITY, UNSPECIFIED WHETHER COMPLICATED, UNSPECIFIED WHETHER PERSISTENT (HHS-HCC): ICD-10-CM

## 2024-05-16 DIAGNOSIS — Z76.0 MEDICATION REFILL: ICD-10-CM

## 2024-05-16 RX ORDER — ALBUTEROL SULFATE 90 UG/1
AEROSOL, METERED RESPIRATORY (INHALATION)
Qty: 1 G | Refills: 5 | Status: SHIPPED | OUTPATIENT
Start: 2024-05-16

## 2024-05-16 RX ORDER — GABAPENTIN 600 MG/1
600 TABLET ORAL 2 TIMES DAILY
Qty: 180 TABLET | Refills: 0 | Status: SHIPPED | OUTPATIENT
Start: 2024-05-16

## 2024-05-16 NOTE — TELEPHONE ENCOUNTER
Rx request received  Pharmacy populated  Last appmt 1/12/24. Has appmt scheduled for 7/12/24 for 6 mo follow

## 2024-06-17 DIAGNOSIS — E11.9 TYPE 2 DIABETES MELLITUS WITHOUT COMPLICATION, WITHOUT LONG-TERM CURRENT USE OF INSULIN (MULTI): Primary | ICD-10-CM

## 2024-06-17 RX ORDER — ATORVASTATIN CALCIUM 10 MG/1
10 TABLET, FILM COATED ORAL DAILY
Qty: 100 TABLET | Refills: 3 | Status: SHIPPED | OUTPATIENT
Start: 2024-06-17 | End: 2024-06-17 | Stop reason: SINTOL

## 2024-07-11 DIAGNOSIS — I10 BENIGN ESSENTIAL HTN: ICD-10-CM

## 2024-07-11 RX ORDER — TORSEMIDE 100 MG/1
50 TABLET ORAL DAILY
Qty: 45 TABLET | Refills: 1 | Status: SHIPPED | OUTPATIENT
Start: 2024-07-11

## 2024-07-12 ENCOUNTER — OFFICE VISIT (OUTPATIENT)
Dept: PRIMARY CARE | Facility: CLINIC | Age: 69
End: 2024-07-12
Payer: MEDICARE

## 2024-07-12 VITALS
OXYGEN SATURATION: 97 % | TEMPERATURE: 96.8 F | SYSTOLIC BLOOD PRESSURE: 118 MMHG | HEART RATE: 72 BPM | BODY MASS INDEX: 45.49 KG/M2 | DIASTOLIC BLOOD PRESSURE: 62 MMHG | WEIGHT: 265 LBS

## 2024-07-12 DIAGNOSIS — I50.9 HEART FAILURE, UNSPECIFIED HF CHRONICITY, UNSPECIFIED HEART FAILURE TYPE (MULTI): ICD-10-CM

## 2024-07-12 DIAGNOSIS — E11.9 TYPE 2 DIABETES MELLITUS WITHOUT COMPLICATION, WITHOUT LONG-TERM CURRENT USE OF INSULIN (MULTI): Primary | ICD-10-CM

## 2024-07-12 DIAGNOSIS — J45.20 MILD INTERMITTENT ASTHMA WITHOUT COMPLICATION (HHS-HCC): ICD-10-CM

## 2024-07-12 DIAGNOSIS — I20.89 ANGINA AT REST (CMS-HCC): ICD-10-CM

## 2024-07-12 DIAGNOSIS — Z12.11 SCREENING FOR MALIGNANT NEOPLASM OF COLON: ICD-10-CM

## 2024-07-12 DIAGNOSIS — E03.9 ACQUIRED HYPOTHYROIDISM: ICD-10-CM

## 2024-07-12 PROCEDURE — 99214 OFFICE O/P EST MOD 30 MIN: CPT | Performed by: NURSE PRACTITIONER

## 2024-07-12 PROCEDURE — 1124F ACP DISCUSS-NO DSCNMKR DOCD: CPT | Performed by: NURSE PRACTITIONER

## 2024-07-12 PROCEDURE — 1036F TOBACCO NON-USER: CPT | Performed by: NURSE PRACTITIONER

## 2024-07-12 PROCEDURE — 3008F BODY MASS INDEX DOCD: CPT | Performed by: NURSE PRACTITIONER

## 2024-07-12 PROCEDURE — 1157F ADVNC CARE PLAN IN RCRD: CPT | Performed by: NURSE PRACTITIONER

## 2024-07-12 PROCEDURE — 3050F LDL-C >= 130 MG/DL: CPT | Performed by: NURSE PRACTITIONER

## 2024-07-12 PROCEDURE — 3078F DIAST BP <80 MM HG: CPT | Performed by: NURSE PRACTITIONER

## 2024-07-12 PROCEDURE — 1126F AMNT PAIN NOTED NONE PRSNT: CPT | Performed by: NURSE PRACTITIONER

## 2024-07-12 PROCEDURE — 1159F MED LIST DOCD IN RCRD: CPT | Performed by: NURSE PRACTITIONER

## 2024-07-12 PROCEDURE — 3074F SYST BP LT 130 MM HG: CPT | Performed by: NURSE PRACTITIONER

## 2024-07-12 ASSESSMENT — ENCOUNTER SYMPTOMS
RESPIRATORY NEGATIVE: 1
OCCASIONAL FEELINGS OF UNSTEADINESS: 1
MUSCULOSKELETAL NEGATIVE: 1
DEPRESSION: 0
CONSTITUTIONAL NEGATIVE: 1
LOSS OF SENSATION IN FEET: 0
GASTROINTESTINAL NEGATIVE: 1
CARDIOVASCULAR NEGATIVE: 1

## 2024-07-12 ASSESSMENT — PATIENT HEALTH QUESTIONNAIRE - PHQ9
2. FEELING DOWN, DEPRESSED OR HOPELESS: NOT AT ALL
SUM OF ALL RESPONSES TO PHQ9 QUESTIONS 1 AND 2: 0
1. LITTLE INTEREST OR PLEASURE IN DOING THINGS: NOT AT ALL

## 2024-07-12 ASSESSMENT — PAIN SCALES - GENERAL: PAINLEVEL: 0-NO PAIN

## 2024-07-12 NOTE — PATIENT INSTRUCTIONS

## 2024-07-12 NOTE — PROGRESS NOTES
"Chief Complaint  Radha Redmond is a 69 y.o. female presenting for \"6 MONTH FOLLOW UP.\"    HPI     Radha Redmond is a 69 y.o. female presenting for 6-month follow-up she is due for lab work, pt does not currently have power of  paperwork, forms given to her and we had a long discussion lasting 15 min about how to fill them out and what measures she wants and does not want, who to choose for her POA etc    Fall last month, got out of the car and it went into a pot hole and landed flat on her stomach on concrete, scratches but no other injury.      Due for colonoscopy      Past Medical History  Patient Active Problem List    Diagnosis Date Noted   • Drusen (degenerative) of macula, bilateral 04/26/2024   • Unspecified disorder of refraction 09/06/2023   • Type 2 diabetes mellitus without complication, without long-term current use of insulin (Multi) 09/06/2023   • Tear film insufficiency 09/06/2023   • Shoulder pain 09/06/2023   • Palpitations 09/06/2023   • Other chronic pain 09/06/2023   • Osteoarthritis of left knee 09/06/2023   • Combined forms of age-related cataract of both eyes 09/06/2023   • Neuropathy 09/06/2023   • Tongue lesion 09/06/2023   • Nasal polyps 09/06/2023   • Muscle spasm 09/06/2023   • Severe obesity (BMI >= 40) (Multi) 09/06/2023   • Mild intermittent asthma without complication ([facility]) 09/06/2023   • Major depressive disorder, single episode, unspecified 09/06/2023   • Loud snoring 09/06/2023   • Iron deficiency anemia 09/06/2023   • Nodular thyroid disease 09/06/2023   • Hypothyroidism 09/06/2023   • Hoarseness 09/06/2023   • History of basal cell carcinoma 09/06/2023   • History of low potassium 09/06/2023   • History of adenomatous polyp of colon 09/06/2023   • Heart failure (Multi) 09/06/2023   • Hypertensive disorder 09/06/2023   • Epistaxis 09/06/2023   • Diverticulosis large intestine w/o perforation or abscess w/o bleeding 09/06/2023   • Daytime somnolence 09/06/2023 "   • Anxiety disorder, unspecified 09/06/2023   • Angina at rest (CMS-HCC) 09/06/2023   • Anemia due to acute blood loss 09/06/2023   • Obstructive sleep apnea syndrome 09/06/2023   • Abnormal metabolic state due to diabetes mellitus (Multi) 09/06/2023        Medications  Current Outpatient Medications   Medication Instructions   • acetaminophen (TYLENOL) 325 mg, oral, Every 4 hours   • albuterol (Ventolin HFA) 90 mcg/actuation inhaler 2 PUFFS AS NEEDED INHALATION EVERY 4 HRS 30 DAY(S)   • apixaban (ELIQUIS) 2.5 mg, oral, 2 times daily   • cholecalciferol (VITAMIN D-3) 25 mcg, oral, Daily   • dapagliflozin propanediol (FARXIGA) 10 mg, oral, Daily   • FLUoxetine (PROZAC) 40 mg, oral, Daily   • fluticasone (Flonase) 50 mcg/actuation nasal spray INSTILL 2 SPRAYS IN EACH NOSTRIL EVERY DAY PLEASE MAKE AN APPOINTMENT WITH DR. BAER NASALLY DAILY 30 DAYS   • fluticasone furoate (Arnuity Ellipta) 100 mcg/actuation inhaler 1 puff, inhalation, Daily   • gabapentin (NEURONTIN) 600 mg, oral, 2 times daily   • Jardiance 10 mg, oral, Daily   • levothyroxine (SYNTHROID, LEVOXYL) 150 mcg, oral, Daily   • metFORMIN (Glucophage) 500 mg tablet TAKE 1 TABLET BY MOUTH TWICE A DAY WITH MEALS FOR 90 DAYS   • multivit-minerals/folic acid (WOMEN'S MULTIVITAMIN GUMMIES ORAL) oral   • spironolactone (Aldactone) 25 mg tablet oral, 2 times daily   • torsemide (DEMADEX) 50 mg, oral, Daily        Surgical History  She has a past surgical history that includes Other surgical history (01/27/2021); Other surgical history (01/27/2021); Other surgical history (01/27/2021); Other surgical history (01/27/2021); Other surgical history (07/12/2021); Other surgical history (07/12/2021); and Other surgical history (07/12/2021).     Social History  She reports that she quit smoking about 36 years ago. Her smoking use included cigarettes. She has never been exposed to tobacco smoke. She has never used smokeless tobacco. She reports current alcohol use of about  1.0 standard drink of alcohol per week. She reports that she does not use drugs.    Family History  Family History   Problem Relation Name Age of Onset   • Diabetes type II Mother     • Prostate cancer Father     • Arthritis Father     • Stroke Son     • Aneurysm Other children         Allergies  Aspirin, Nsaids (non-steroidal anti-inflammatory drug), Statins-hmg-coa reductase inhibitors, and Ezetimibe    ROS  Review of Systems   Constitutional: Negative.    Respiratory: Negative.     Cardiovascular: Negative.    Gastrointestinal: Negative.    Genitourinary: Negative.    Musculoskeletal: Negative.         Last Recorded Vitals  /62   Pulse 72   Temp 36 °C (96.8 °F)   Wt 120 kg (265 lb)   SpO2 97%     Physical Exam  Vitals and nursing note reviewed.   Constitutional:       Appearance: Normal appearance.   Eyes:      Pupils: Pupils are equal, round, and reactive to light.   Cardiovascular:      Rate and Rhythm: Normal rate and regular rhythm.      Pulses: Normal pulses.      Heart sounds: Normal heart sounds.   Pulmonary:      Effort: Pulmonary effort is normal.      Breath sounds: Normal breath sounds.   Neurological:      Mental Status: She is alert.       Relevant Results      Assessment/Plan   Radha was seen today for 6 month follow up.  Diagnoses and all orders for this visit:  Type 2 diabetes mellitus without complication, without long-term current use of insulin (Multi) (Primary)  -     Lipid Panel; Future  -     Hemoglobin A1C; Future  Heart failure, unspecified HF chronicity, unspecified heart failure type (Multi)  -     Comprehensive Metabolic Panel; Future  Angina at rest (CMS-HCC)  Comments:  Stable  Acquired hypothyroidism  -     TSH with reflex to Free T4 if abnormal; Future  Mild intermittent asthma without complication ([facility] [place])  Comments:  stable, doing well this year  Screening for malignant neoplasm of colon  -     Referral to General Surgery; Future          COUNSELING      Medication  education:              Education:  The patient is counseled regarding potential side-effects of any and all new medications             Understanding:  Patient expressed understanding             Adherence:  No barriers to adherence identified        GISELE Cruz       Patient was identified as a fall risk. Risk prevention instructions provided.

## 2024-07-15 ENCOUNTER — LAB (OUTPATIENT)
Dept: LAB | Facility: LAB | Age: 69
End: 2024-07-15
Payer: MEDICARE

## 2024-07-15 DIAGNOSIS — I50.9 HEART FAILURE, UNSPECIFIED HF CHRONICITY, UNSPECIFIED HEART FAILURE TYPE (MULTI): ICD-10-CM

## 2024-07-15 DIAGNOSIS — N18.30 CHRONIC KIDNEY DISEASE, STAGE 3 UNSPECIFIED (MULTI): Primary | ICD-10-CM

## 2024-07-15 DIAGNOSIS — E03.9 ACQUIRED HYPOTHYROIDISM: ICD-10-CM

## 2024-07-15 DIAGNOSIS — E11.9 TYPE 2 DIABETES MELLITUS WITHOUT COMPLICATION, WITHOUT LONG-TERM CURRENT USE OF INSULIN (MULTI): ICD-10-CM

## 2024-07-15 LAB
ALBUMIN SERPL-MCNC: 3.9 G/DL (ref 3.5–5)
ALP BLD-CCNC: 94 U/L (ref 35–125)
ALT SERPL-CCNC: 13 U/L (ref 5–40)
ANION GAP SERPL CALC-SCNC: 16 MMOL/L
AST SERPL-CCNC: 18 U/L (ref 5–40)
BILIRUB SERPL-MCNC: 0.8 MG/DL (ref 0.1–1.2)
BUN SERPL-MCNC: 20 MG/DL (ref 8–25)
CALCIUM SERPL-MCNC: 8.8 MG/DL (ref 8.5–10.4)
CHLORIDE SERPL-SCNC: 99 MMOL/L (ref 97–107)
CHOLEST SERPL-MCNC: 213 MG/DL (ref 133–200)
CHOLEST/HDLC SERPL: 4.2 {RATIO}
CO2 SERPL-SCNC: 23 MMOL/L (ref 24–31)
CREAT SERPL-MCNC: 0.9 MG/DL (ref 0.4–1.6)
EGFRCR SERPLBLD CKD-EPI 2021: 69 ML/MIN/1.73M*2
ERYTHROCYTE [DISTWIDTH] IN BLOOD BY AUTOMATED COUNT: 12.9 % (ref 11.5–14.5)
EST. AVERAGE GLUCOSE BLD GHB EST-MCNC: 151 MG/DL
GLUCOSE SERPL-MCNC: 128 MG/DL (ref 65–99)
HBA1C MFR BLD: 6.9 %
HCT VFR BLD AUTO: 44.2 % (ref 36–46)
HDLC SERPL-MCNC: 51 MG/DL
HGB BLD-MCNC: 13.9 G/DL (ref 12–16)
LDLC SERPL CALC-MCNC: 121 MG/DL (ref 65–130)
MCH RBC QN AUTO: 29 PG (ref 26–34)
MCHC RBC AUTO-ENTMCNC: 31.4 G/DL (ref 32–36)
MCV RBC AUTO: 92 FL (ref 80–100)
NRBC BLD-RTO: 0 /100 WBCS (ref 0–0)
PHOSPHATE SERPL-MCNC: 4 MG/DL (ref 2.5–4.5)
PLATELET # BLD AUTO: 365 X10*3/UL (ref 150–450)
POTASSIUM SERPL-SCNC: 4.9 MMOL/L (ref 3.4–5.1)
PROT SERPL-MCNC: 6.6 G/DL (ref 5.9–7.9)
PTH-INTACT SERPL-MCNC: 95.1 PG/ML (ref 18.5–88)
RBC # BLD AUTO: 4.8 X10*6/UL (ref 4–5.2)
SODIUM SERPL-SCNC: 138 MMOL/L (ref 133–145)
TRIGL SERPL-MCNC: 207 MG/DL (ref 40–150)
TSH SERPL DL<=0.05 MIU/L-ACNC: 1.37 MIU/L (ref 0.27–4.2)
WBC # BLD AUTO: 6.2 X10*3/UL (ref 4.4–11.3)

## 2024-07-15 PROCEDURE — 36415 COLL VENOUS BLD VENIPUNCTURE: CPT

## 2024-07-15 PROCEDURE — 80053 COMPREHEN METABOLIC PANEL: CPT

## 2024-07-15 PROCEDURE — 84100 ASSAY OF PHOSPHORUS: CPT

## 2024-07-15 PROCEDURE — 83970 ASSAY OF PARATHORMONE: CPT

## 2024-07-15 PROCEDURE — 84443 ASSAY THYROID STIM HORMONE: CPT

## 2024-07-15 PROCEDURE — 83036 HEMOGLOBIN GLYCOSYLATED A1C: CPT

## 2024-07-15 PROCEDURE — 85027 COMPLETE CBC AUTOMATED: CPT

## 2024-07-15 PROCEDURE — 80061 LIPID PANEL: CPT

## 2024-07-22 ENCOUNTER — APPOINTMENT (OUTPATIENT)
Dept: SURGERY | Facility: CLINIC | Age: 69
End: 2024-07-22
Payer: MEDICARE

## 2024-07-24 ENCOUNTER — TELEMEDICINE CLINICAL SUPPORT (OUTPATIENT)
Dept: SURGERY | Facility: CLINIC | Age: 69
End: 2024-07-24
Payer: MEDICARE

## 2024-07-24 VITALS — WEIGHT: 265 LBS | BODY MASS INDEX: 44.15 KG/M2 | HEIGHT: 65 IN

## 2024-07-24 DIAGNOSIS — Z12.11 SCREENING FOR MALIGNANT NEOPLASM OF COLON: ICD-10-CM

## 2024-07-24 ASSESSMENT — ENCOUNTER SYMPTOMS
OCCASIONAL FEELINGS OF UNSTEADINESS: 1
LOSS OF SENSATION IN FEET: 0
DEPRESSION: 0

## 2024-07-24 ASSESSMENT — PAIN SCALES - GENERAL: PAINLEVEL: 0-NO PAIN

## 2024-07-24 NOTE — Clinical Note
Pls schedule pt for screening Colonoscopy on 9/25/24 with Dr. Guzman at [facility]. Surgery itinerary sent to the pt's MyChart.

## 2024-07-24 NOTE — PATIENT INSTRUCTIONS
Thank you for scheduling with Dr. Guzman. Below you will find your Procedure Itinerary to include dates, times, and locations for appointments involved with your procedure.    You may be scheduled for a Pre Admission Testing appointment. A representative from the hospital will contact you directly to schedule any Pre Admission Testing appointments needed.    On the day before the scheduled procedure, please call the Same Day Surgery department between 2-4 pm for a time of arrival for the day of procedure.  [facility] [phone]    Nothing to eat or drink after midnight the night before surgery    Procedure with Dr. Guzman at [facility] - [address] [place] [address]   On 9/25/24.    *Please note, you may receive a call from our financial counselors if you have a financial liability greater than $250.         PLEASE FOLLOW BOWEL PREP INSTRUCTIONS BELOW    In order to maximize the chance of seeing small polyps, it is important to clean your intestinal tract of all fecal material. In order to accomplish this, you must follow these instructions.    You last solid food should be two days prior to your surgery. This date is 9/23/24 at 5:00p.m. After this meal you will only be able to take clear liquids. Broth, Jell-O, clear fruit juices, ginger ale, plain tea or coffee (No cream, milk, or sugar,) soda water and popsicles without fruit are the only allowed food. No red or purple colored liquids are allowed.    On the day before colonoscopy, 9/24/24 you will be required to take the following bowel prep and medications.    Purchase Miralax 238gm bottle  Purchase 4 Dulcolax tablets  Purchase 1 soft gel tablet of Simethicone 125mg (Gas-X or generic)  Purchase one 64oz bottle of Gatorade, Propel, or Powerade (NO red or purple)    DIRECTIONS    In the morning, divide into two 32 ounce pitchers: 64 ounces of sports drink and the entire bottle of Miralax powder and refrigerate  At  1:00p.m. take the 4 Dulcolax tablets and 1 tablet of Simethicone  At 4:00p.m.-start drinking the first pitcher of 32 ounces of Miralax prep, one large (8oz) glass every 10-15 minutes until gone. Drink rapidly, with a straw  Bowel movements should begin within one hour.  At 10:00p.m. drink the second pitcher of Miralax. Your stool should be clear.    You may continue to have clear liquids that evening, but you should have nothing by mouth after midnight the night before the procedure except for sips of water with your medications.         [facility]  Pre - Operative Instructions     Your time of arrival for surgery is available the day before surgery. *If the surgery is on a Monday, or the Tuesday following a Monday Holiday, please call Same Day Surgery the Friday before your surgery date.*    DO NOT EAT OR DRINK ANYTHING AFTER MIDNIGHT THE NIGHT BEFORE SURGERY. This includes any beverages (coffee, water, soda, etc.), hard candy, gum or mints. If this is not followed, surgery may be canceled. Please avoid eating a large meal the evening before surgery. Please do not DRINK ALCOHOL or SMOKE FOR 24 HOURS before surgery.     Insulin Instructions - Please do not take any short acting Insulin (Regular or NPH). Do not take any oral diabetic medication on the day of surgery. Long acting Insulins may be taken (Lantus). We will check your blood sugar and administer at the hospital the day of surgery. Patient who have Insulin pumps are to make NO adjustments.     Prescription Medications - You are encouraged to take prescription medications including heart, blood pressure, anti-seizure, anxiety, breathing medications (including inhalers) with exception to diabetic medications prior to arriving at the hospital the day of surgery. You may take prescribed pain medications as needed. Please remember the dose and time taken so we may inform your Anesthesiologist.     Please bring the name, dosage, and frequency of your  medications if you did not provide these on the day of Pre Admission Testing. You may bring the actual bottles if this would be easier for you.     Please bring your prescribed inhalers with you the morning of surgery.     Patients on Anti-platelet and Anticoagulant agents, please read the following:  ASA, NSAIDS stop 5 days prior to surgery  Coumadin stop 5 days prior to surgery unless bridging therapy is needed (metal valve replacement, cardiac stent placement) - if so please speak to your Cardiologist or prescribing physician.   Other anti-platelet and anticoagulant agents:  Plavix (Clopidogrel) stop 5 days prior to surgery  Brilinta (Ticagrelor) stop 5 days prior to surgery   Effient (Prasugrel) stop 7 days prior to surgery   Lovenox (Enoxaparin) stop 24 hours prior to surgery  Arixtra (Fondaparinux) stop 5 days prior to surgery  Xarelto (Rivaroxaban) stop 3 days prior to surgery  Pradaxa (Dabigatran) stop 5 days prior to surgery  Eliquis (Apixaban) stop 3 days prior to surgery   Savaysa (Endoxaban) stop days prior to surgery     Please stop all herbal medications 2 weeks prior to surgery     C-PAP Devices - If you have a C-PAP device at home, bring it with you on our day of surgery if your surgery requires you to stay overnight.     Please bring a copy of any Advanced Directives the day of surgery if you did not provide it at Pre Admission Testing. These documents are living chand and durable power of  for healthcare.     Please notify your physician/surgeon if you develop a cold, sore throat, fever, flu symptoms, COVID symptoms or any changes in your physical conditions.     A shower or bath is preferred the evening before or the morning of surgery.     Remove jewelry before admission to the hospital. It is no longer permitted to tape rings. We ask that you leave all valuables at home. Any items of value will be given to a family member or locked up by security.   If you have a body piercing g that you  cannot remove, it is recommended that you have it removed professionally and have a plastic spacer inserted. There is a risk for surgical burns with jewelry left in place.     Remove or wear minimal makeup the day of surgery. You will be asked to remove glasses and contact lenses prior to surgery. Please bring a glass case and/or contact lens case with you. These items are not provided.     Dentures and partials are usually removed prior to surgery. A denture cup will be provided for you.       You may be asked to remove nail polish. Acrylic nails are now acceptable.     Wear loose comfortable clothing that you will be able to fit over bandages when you leave the hospitals (as appropriate for your surgery).    Patients that are under the age of 18 years must have a parent or guardian present the day of surgery.     Family members of significant others may stay with you on the Same Day Surgery unit. While you are in surgery, they may wait for you in the family waiting area. The physician will speak to the waiting family, if permitted by the patient, after surgery.     Changes or delays in the surgery schedule may occur due to emergencies. The hospital will notify you if this occurs. We apologize for any inconveniences this may present.     You must have a responsible  available to drive you home after surgery. You will not be permitted to drive yourself home after surgery if you have received any anesthesia or sedation during your procedure.     Please visit our website at [email or web address] for more information regarding [facility] services.      For questions about your Pre Admission Testing (PAT)  [facility] [phone]  [facility] [phone]    Thank you for choosing [facility]!

## 2024-07-24 NOTE — PROGRESS NOTES
This is a 69  year old  female who presents for telemedicine visit via telephone to discuss screening colonoscopy referred by JUDY TAYLOR  for Dr. Guzman.  Patient states previous colonoscopy was done on 1/17/18 with Dr. Banks. Patient stated that she had colon polyps removed during that colonoscopy.    Patient denies change in bowel habits, diarrhea, constipation, change in caliber of the stool, blood or mucous in stool, rectal pain, fevers, unintentional weight loss. Patient also denies issues with abdominal pain, nausea, vomiting, acid reflux, indigestion.    Patient denies a family history of colorectal cancer, colon polyps, IBD, other gastro intestinal issues. Patient denies use of blood thinners, NSAIDs. Patient denies use of tobacco, other drug use. Social alcohol use. All pertinent medical history, surgical history, social and family history were reviewed and updated with the patient.    Patient is unable to be physically examined due to telephone visit but denies chest pain, shortness of breath, abdominal pain or tenderness, skin abnormalities. Denies knowledge of bright red blood per rectum or hemorrhoids.    Risks, benefits, and alternatives to colonoscopy were discussed. Alternatives including colo guard and FIT testing were discussed as well as their inability to remove polyps that were detected. Patient understands that a bowel prep must be completed for adequate evaluation of the colon, and inadequate prep may allow missed lesions. Patient understands a risk of perforation is less than 1 in 5000 colonoscopy, risk of serious bleeding or abdominal pain is less than 1%. Patient agrees to proceed with colonoscopy with possible biopsy.    Bowel prep instructions were discussed in detail with patient and reviewed thoroughly. Bowel prep instructions will be mailed to patient's home address on file or sent to the patient's MyChart.  Colonoscopy procedure, risks, and benefits were explained and reviewed with  patient. All questions and concerns were addressed this visit.    Patient to be scheduled for screening colonoscopy under MAC with Dr. Guzman at [facility].   Over 30 minutes was spent on this patient counter including televisit, patient counseling and education, assessment and plan, reviewing necessary labs and diagnostics, and discussing pertinent education.      Counseling:  Medication education:  Education:  Current Medication list reviewed and discussed, Understanding:  Patient expressed understanding, Adherence:  No barriers to adherence identified, Education:  Current Medication list reviewed and discussed, Understanding:  Patient expressed understanding, Adherence:  No barriers to adherence identified.     Patient given give the next August and September available dates to schedule Colonoscopy. Patient stated that she has to check with her daughter to see which day she is able to schedule the Colonoscopy.

## 2024-07-29 ENCOUNTER — TELEPHONE (OUTPATIENT)
Dept: SURGERY | Facility: CLINIC | Age: 69
End: 2024-07-29
Payer: MEDICARE

## 2024-07-29 NOTE — TELEPHONE ENCOUNTER
Spoke to pt verified by name/.  Pt called the office stating that she is able  To schedule the screening Colonoscopy on 24.  Surgery itinerary updated and sent to the pt's MyChart.   Chart sent to GERMAIN Zepeda to schedule Colonoscopy.

## 2024-07-30 DIAGNOSIS — Z12.11 COLON CANCER SCREENING: ICD-10-CM

## 2024-08-14 DIAGNOSIS — I10 ESSENTIAL (PRIMARY) HYPERTENSION: ICD-10-CM

## 2024-08-14 DIAGNOSIS — Z76.0 MEDICATION REFILL: ICD-10-CM

## 2024-08-14 RX ORDER — LEVOTHYROXINE SODIUM 150 UG/1
150 TABLET ORAL DAILY
Qty: 90 TABLET | Refills: 1 | Status: SHIPPED | OUTPATIENT
Start: 2024-08-14

## 2024-08-14 RX ORDER — GABAPENTIN 600 MG/1
600 TABLET ORAL 2 TIMES DAILY
Qty: 180 TABLET | Refills: 0 | Status: SHIPPED | OUTPATIENT
Start: 2024-08-14

## 2024-08-14 RX ORDER — SPIRONOLACTONE 25 MG/1
TABLET ORAL 2 TIMES DAILY
Qty: 90 TABLET | Refills: 1 | Status: SHIPPED | OUTPATIENT
Start: 2024-08-14

## 2024-08-14 NOTE — TELEPHONE ENCOUNTER
Prescription request received and populated   Pharmacy populated  Last Office Visit: 7/12/24 for 6 MO Follow Up. Has upcoing appmt scheduled for 1/2025

## 2024-08-30 DIAGNOSIS — I10 ESSENTIAL (PRIMARY) HYPERTENSION: ICD-10-CM

## 2024-09-03 RX ORDER — FLUTICASONE PROPIONATE 50 MCG
SPRAY, SUSPENSION (ML) NASAL
Qty: 48 ML | Refills: 1 | Status: SHIPPED | OUTPATIENT
Start: 2024-09-03

## 2024-09-09 ENCOUNTER — APPOINTMENT (OUTPATIENT)
Dept: CARDIOLOGY | Facility: CLINIC | Age: 69
End: 2024-09-09
Payer: MEDICARE

## 2024-09-18 ENCOUNTER — PRE-ADMISSION TESTING (OUTPATIENT)
Dept: PREADMISSION TESTING | Facility: HOSPITAL | Age: 69
End: 2024-09-18
Payer: MEDICARE

## 2024-09-18 VITALS
HEART RATE: 65 BPM | BODY MASS INDEX: 45.13 KG/M2 | DIASTOLIC BLOOD PRESSURE: 61 MMHG | WEIGHT: 264.33 LBS | HEIGHT: 64 IN | OXYGEN SATURATION: 99 % | TEMPERATURE: 96.4 F | RESPIRATION RATE: 18 BRPM | SYSTOLIC BLOOD PRESSURE: 144 MMHG

## 2024-09-18 DIAGNOSIS — Z01.818 PRE-OP EXAMINATION: Primary | ICD-10-CM

## 2024-09-18 PROCEDURE — 99203 OFFICE O/P NEW LOW 30 MIN: CPT

## 2024-09-18 PROCEDURE — 93010 ELECTROCARDIOGRAM REPORT: CPT | Performed by: INTERNAL MEDICINE

## 2024-09-18 PROCEDURE — 93005 ELECTROCARDIOGRAM TRACING: CPT

## 2024-09-18 ASSESSMENT — DUKE ACTIVITY SCORE INDEX (DASI)
TOTAL_SCORE: 36.7
CAN YOU RUN A SHORT DISTANCE: NO
CAN YOU DO MODERATE WORK AROUND THE HOUSE LIKE VACUUMING, SWEEPING FLOORS OR CARRYING GROCERIES: YES
CAN YOU CLIMB A FLIGHT OF STAIRS OR WALK UP A HILL: YES
DASI METS SCORE: 7.3
CAN YOU PARTICIPATE IN STRENOUS SPORTS LIKE SWIMMING, SINGLES TENNIS, FOOTBALL, BASKETBALL, OR SKIING: NO
CAN YOU WALK A BLOCK OR TWO ON LEVEL GROUND: YES
CAN YOU WALK INDOORS, SUCH AS AROUND YOUR HOUSE: YES
CAN YOU TAKE CARE OF YOURSELF (EAT, DRESS, BATHE, OR USE TOILET): YES
CAN YOU PARTICIPATE IN MODERATE RECREATIONAL ACTIVITIES LIKE GOLF, BOWLING, DANCING, DOUBLES TENNIS OR THROWING A BASEBALL OR FOOTBALL: NO
CAN YOU DO YARD WORK LIKE RAKING LEAVES, WEEDING OR PUSHING A MOWER: YES
CAN YOU DO LIGHT WORK AROUND THE HOUSE LIKE DUSTING OR WASHING DISHES: YES
CAN YOU DO HEAVY WORK AROUND THE HOUSE LIKE SCRUBBING FLOORS OR LIFTING AND MOVING HEAVY FURNITURE: YES
CAN YOU HAVE SEXUAL RELATIONS: YES

## 2024-09-18 ASSESSMENT — PAIN SCALES - GENERAL: PAINLEVEL_OUTOF10: 0 - NO PAIN

## 2024-09-18 ASSESSMENT — ENCOUNTER SYMPTOMS
ENDOCRINE NEGATIVE: 1
EYES NEGATIVE: 1
ALLERGIC/IMMUNOLOGIC NEGATIVE: 1
HEMATOLOGIC/LYMPHATIC NEGATIVE: 1
CONSTITUTIONAL NEGATIVE: 1
RESPIRATORY NEGATIVE: 1
PSYCHIATRIC NEGATIVE: 1
CARDIOVASCULAR NEGATIVE: 1
CONSTIPATION: 1

## 2024-09-18 ASSESSMENT — PAIN - FUNCTIONAL ASSESSMENT: PAIN_FUNCTIONAL_ASSESSMENT: 0-10

## 2024-09-18 NOTE — PREPROCEDURE INSTRUCTIONS
Medication List            Accurate as of September 18, 2024 12:40 PM. Always use your most recent med list.                albuterol 90 mcg/actuation inhaler  Commonly known as: Ventolin HFA  2 PUFFS AS NEEDED INHALATION EVERY 4 HRS 30 DAY(S)  Medication Adjustments for Surgery: Take/Use as prescribed     apixaban 2.5 mg tablet  Commonly known as: Eliquis  Notes to patient: PATIENT DOES NOT TAKE ELIQUIS     Arnuity Ellipta 100 mcg/actuation inhaler  Generic drug: fluticasone furoate  Inhale 1 puff once daily.  Medication Adjustments for Surgery: Take/Use as prescribed     cholecalciferol 25 MCG (1000 UT) tablet  Commonly known as: Vitamin D-3  Additional Medication Adjustments for Surgery: Take last dose 7 days before surgery     dapagliflozin propanediol 10 mg  Commonly known as: Farxiga  Notes to patient: PATIENT STATES SHE DOES NOT TAKE     FLUoxetine 40 mg capsule  Commonly known as: PROzac  TAKE 1 CAPSULE BY MOUTH EVERY DAY FOR 90 DAYS  Medication Adjustments for Surgery: Take/Use as prescribed     fluticasone 50 mcg/actuation nasal spray  Commonly known as: Flonase  INSTILL 2 SPRAYS IN EACH NOSTRIL EVERY DAY PLEASE MAKE AN APPOINTMENT WITH DR. BAER NASALLY DAILY 30 DAYS  Medication Adjustments for Surgery: Take/Use as prescribed     gabapentin 600 mg tablet  Commonly known as: Neurontin  TAKE 1 TABLET (600 MG) BY MOUTH 2 TIMES A DAY.  Medication Adjustments for Surgery: Take last dose 3 days before surgery     Jardiance 10 mg  Generic drug: empagliflozin  Notes to patient: LAST DOSE 9/21/24     levothyroxine 150 mcg tablet  Commonly known as: Synthroid, Levoxyl  TAKE 1 TABLET BY MOUTH EVERY DAY  Medication Adjustments for Surgery: Take/Use as prescribed     metFORMIN 500 mg tablet  Commonly known as: Glucophage  TAKE 1 TABLET BY MOUTH TWICE A DAY WITH MEALS FOR 90 DAYS  Medication Adjustments for Surgery: Do Not take on the morning of surgery     spironolactone 25 mg tablet  Commonly known as:  Aldactone  TAKE 1/2 TABLET BY MOUTH TWICE A DAY  Medication Adjustments for Surgery: Take/Use as prescribed     torsemide 100 mg tablet  Commonly known as: Demadex  TAKE 1/2 TABLET BY MOUTH EVERY DAY  Medication Adjustments for Surgery: Do Not take on the morning of surgery     TylenoL 325 mg tablet  Generic drug: acetaminophen  Medication Adjustments for Surgery: Take/Use as prescribed     WOMEN'S MULTIVITAMIN GUMMIES ORAL  Additional Medication Adjustments for Surgery: Take last dose 7 days before surgery                              NPO Instructions:    Do not eat any food after midnight the night before your surgery/procedure.  You may have clear liquids until TWO hours before surgery/procedure. This includes water, black tea/coffee, (no milk or cream) apple juice and electrolyte drinks (Gatorade).  You may chew gum up to TWO hours before your surgery/procedure.    Additional Instructions:     Day of Surgery:  Review your medication instructions, take indicated medications  If you have diabetes, please check your fasting blood sugar upon awakening.  If fasting blood sugar is <80 mg/dl, drink 100 ml of apple juice, time limit of 2 hours before  You may have clear liquids until TWO hours before surgery/procedure.  This includes water, black tea/coffee, (no milk or cream) apple juice and electrolyte drinks (Gatorade)  You may chew gum up to TWO hours before your surgery/procedure  Wear  comfortable loose fitting clothing  Do not use moisturizers, creams, lotions or perfume  All jewelry and valuables should be left at homePAT DISCHARGE INSTRUCTIONS    Please call the Same Day Surgery (SDS) Department of the hospital where your procedure will be performed after 2:00 PM the day before your surgery. If you are scheduled on a Monday, or a Tuesday following a Monday holiday, you will need to call on the last business day prior to your surgery.    [address]  [address]  [phone]  [facility]  [address] [facility], [id number] [phone]  [facility]  [address] [place] [address]  [phone]    Please let your surgeon know if:      You develop any open sores, shingles, burning or painful urination as these may increase your risk of an infection.   You no longer wish to have the surgery.   Any other personal circumstances change that may lead to the need to cancel or defer this surgery-such as being sick or getting admitted to any hospital within one week of your planned procedure.    Your contact details change, such as a change of address or phone number.    Starting now:     Please DO NOT drink alcohol or smoke for 24 hours before surgery. It is well known that quitting smoking can make a huge difference to your health and recovery from surgery. The longer you abstain from smoking, the better your chances of a healthy recovery. If you need help with quitting, call 7-427-QUIT-NOW to be connected to a trained counselor who will discuss the best methods to help you quit.     Before your surgery:    Please stop all supplements 7 days prior to surgery. Or as directed by your surgeon.   Please stop taking NSAID pain medicine such as Advil and Motrin 7 days before surgery.    If you develop any fever, cough, cold, rashes, cuts, scratches, scrapes, urinary symptoms or infection anywhere on your body (including teeth and gums) prior to surgery, please call your surgeon’s office as soon as possible. This may require treatment to reduce the chance of cancellation on the day of surgery.    The day before your surgery:   DIET- Please follow the diet instructions at the top of your packet.   Get a good night’s rest.  Use the special soap for bathing if you have been instructed to use one.    Scheduled surgery times may change and you will be notified if  this occurs - please check your personal voicemail for any updates.     On the morning of surgery:   Wear comfortable, loose fitting clothes which open in the front. Please do not wear moisturizers, creams, lotions, makeup or perfume.    Please bring with you to surgery:   Photo ID and insurance card   Current list of medicines and allergies   Pacemaker/ Defibrillator/Heart stent cards   CPAP machine and mask    Slings/ splints/ crutches   A copy of your complete advanced directive/DHPOA.    Please do NOT bring with you to surgery:   All jewelry and valuables should be left at home.   Prosthetic devices such as contact lenses, hearing aids, dentures, eyelash extensions, hairpins and body piercings must be removed prior to going in to the surgical suite.    After outpatient surgery:   A responsible adult MUST accompany you at the time of discharge and stay with you for 24 hours after your surgery. You may NOT drive yourself home after surgery.    Do not drive, operate machinery, make critical decisions or do activities that require co-ordination or balance until after a night’s sleep.   Do not drink alcoholic beverages for 24 hours.   Instructions for resuming your medications will be provided by your surgeon.    CALL YOUR DOCTOR AFTER SURGERY IF YOU HAVE:     Chills and/or a fever of 101° F or higher.    Redness, swelling, pus or drainage from your surgical wound or a bad smell from the wound.    Lightheadedness, fainting or confusion.    Persistent vomiting (throwing up) and are not able to eat or drink for 12 hours.    Three or more loose, watery bowel movements in 24 hours (diarrhea).   Difficulty or pain while urinating( after non-urological surgery)    Pain and swelling in your legs, especially if it is only on one side.    Difficulty breathing or are breathing faster than normal.    Any new concerning symptoms.

## 2024-09-18 NOTE — CPM/PAT H&P
CPM/PAT Evaluation       Name: Radha Redmond (Radha Redmond)  /Age: 1955/69 y.o.     In-Person       Chief Complaint: Colonoscopy    HPI: Radha Redmond is a 69 year old female with a history of diabetes, hypothyroid, hypertension, VASU, asthma, and osteoarthritis. She is scheduled to have a colonoscopy. She states she had a colonoscopy in the past where polyps were removed. She denies blood in the stool, changes in bowel habits, and unintentional weight loss. She denies fever, chills, nausea, vomiting, chest pain, sob, dizziness, and palpitations.    Past Medical History:   Diagnosis Date    Age-related nuclear cataract of both eyes     Drusen (degenerative) of macula, bilateral     Dry eye syndrome of bilateral lacrimal glands     Personal history of other diseases of the circulatory system     History of coronary artery disease    Personal history of other diseases of the circulatory system     History of hypertension    Personal history of other diseases of the respiratory system     History of asthma    Personal history of other endocrine, nutritional and metabolic disease     History of diabetes mellitus    Personal history of other endocrine, nutritional and metabolic disease     History of high cholesterol    Personal history of other malignant neoplasm of skin     History of basal cell cancer    Type 2 diabetes mellitus with other diabetic kidney complication (Multi)     Type 2 diabetes mellitus with other kidney complication    Unspecified disorder of refraction        Past Surgical History:   Procedure Laterality Date    COLONOSCOPY W/ BIOPSIES  2018    Dr. Banks    OTHER SURGICAL HISTORY  2021    Nasal polypectomy    OTHER SURGICAL HISTORY  2021    Knee surgery    OTHER SURGICAL HISTORY  2021    Thyroidectomy    OTHER SURGICAL HISTORY  2021    Appendectomy    OTHER SURGICAL HISTORY  2021    Hip surgery    OTHER SURGICAL HISTORY  1989    Umbilical  "Hernia repair    OTHER SURGICAL HISTORY  2021    Anal surgery       Social History     Tobacco Use    Smoking status: Former     Current packs/day: 0.00     Types: Cigarettes     Quit date: 1988     Years since quittin.7     Passive exposure: Never    Smokeless tobacco: Never   Substance Use Topics    Alcohol use: Yes     Alcohol/week: 1.0 standard drink of alcohol     Types: 1 Glasses of wine per week     Social History     Substance and Sexual Activity   Drug Use Never         Family History   Problem Relation Name Age of Onset    Diabetes type II Mother      Prostate cancer Father      Arthritis Father      Stroke Son      Aneurysm Other children        Allergies   Allergen Reactions    Aspirin Other and Unknown     ASTHMA ATTACK    Nsaids (Non-Steroidal Anti-Inflammatory Drug) Other and Unknown     ASTHMA ATTACK    Statins-Hmg-Coa Reductase Inhibitors Other     \"ELEVATED LIVER ENZYMES\"    Ezetimibe Unknown     myositis     Current Outpatient Medications   Medication Sig Dispense Refill    acetaminophen (TylenoL) 325 mg tablet Take 1 tablet (325 mg) by mouth every 4 hours.      fluticasone (Flonase) 50 mcg/actuation nasal spray INSTILL 2 SPRAYS IN EACH NOSTRIL EVERY DAY PLEASE MAKE AN APPOINTMENT WITH DR. BAER NASALLY DAILY 30 DAYS 48 mL 1    gabapentin (Neurontin) 600 mg tablet TAKE 1 TABLET (600 MG) BY MOUTH 2 TIMES A DAY. 180 tablet 0    Jardiance 10 mg Take 1 tablet (10 mg) by mouth once daily.      metFORMIN (Glucophage) 500 mg tablet TAKE 1 TABLET BY MOUTH TWICE A DAY WITH MEALS FOR 90 DAYS 180 tablet 1    spironolactone (Aldactone) 25 mg tablet TAKE 1/2 TABLET BY MOUTH TWICE A DAY 90 tablet 1    torsemide (Demadex) 100 mg tablet TAKE 1/2 TABLET BY MOUTH EVERY DAY 45 tablet 1    albuterol (Ventolin HFA) 90 mcg/actuation inhaler 2 PUFFS AS NEEDED INHALATION EVERY 4 HRS 30 DAY(S) (Patient not taking: Reported on 2024) 1 g 5    apixaban (Eliquis) 2.5 mg tablet Take 1 tablet (2.5 mg) by " mouth 2 times a day.      cholecalciferol (Vitamin D-3) 25 MCG (1000 UT) tablet Take 1 tablet (25 mcg) by mouth once daily.      dapagliflozin propanediol (Farxiga) 10 mg Take 1 tablet (10 mg) by mouth once daily.      FLUoxetine (PROzac) 40 mg capsule TAKE 1 CAPSULE BY MOUTH EVERY DAY FOR 90 DAYS 90 capsule 1    fluticasone furoate (Arnuity Ellipta) 100 mcg/actuation inhaler Inhale 1 puff once daily. (Patient not taking: Reported on 7/24/2024) 14 each 0    levothyroxine (Synthroid, Levoxyl) 150 mcg tablet TAKE 1 TABLET BY MOUTH EVERY DAY 90 tablet 1    multivit-minerals/folic acid (WOMEN'S MULTIVITAMIN GUMMIES ORAL) Take by mouth.       No current facility-administered medications for this visit.       Review of Systems   Constitutional: Negative.    HENT: Negative.     Eyes: Negative.    Respiratory: Negative.     Cardiovascular: Negative.    Gastrointestinal:  Positive for constipation (pt states she increased her intake of fiber that helps).   Endocrine: Negative.    Genitourinary: Negative.    Musculoskeletal:  Positive for gait problem (Hx of bilateral knee replacements and right hip surgery.).   Skin: Negative.    Allergic/Immunologic: Negative.    Hematological: Negative.    Psychiatric/Behavioral: Negative.                Physical Exam  Vitals reviewed.   Constitutional:       Appearance: Normal appearance. She is obese.   HENT:      Head: Normocephalic and atraumatic.      Nose: Nose normal.      Mouth/Throat:      Mouth: Mucous membranes are moist.      Pharynx: Oropharynx is clear.   Eyes:      Extraocular Movements: Extraocular movements intact.      Conjunctiva/sclera: Conjunctivae normal.   Cardiovascular:      Rate and Rhythm: Normal rate and regular rhythm.      Pulses: Normal pulses.      Heart sounds: Normal heart sounds.   Pulmonary:      Effort: Pulmonary effort is normal.      Breath sounds: Normal breath sounds.   Abdominal:      General: Bowel sounds are normal.      Palpations: Abdomen is  "soft.   Genitourinary:     Comments: Assessment referred to physician    Musculoskeletal:      Cervical back: Normal range of motion and neck supple.      Right lower leg: Edema present.      Left lower leg: Edema present.   Skin:     General: Skin is warm and dry.   Neurological:      General: No focal deficit present.      Mental Status: She is alert and oriented to person, place, and time.   Psychiatric:         Mood and Affect: Mood normal.         Behavior: Behavior normal.         Thought Content: Thought content normal.         Judgment: Judgment normal.          PAT AIRWAY:   Airway:     Mallampati::  III    TM distance::  >3 FB    Neck ROM::  Full  normal      /61   Pulse 65   Temp 35.8 °C (96.4 °F) (Temporal)   Resp 18   Ht 1.626 m (5' 4\")   Wt 120 kg (264 lb 5.3 oz)   LMP  (LMP Unknown)   SpO2 99%   BMI 45.37 kg/m²     ASA: III  INGRIS: 4.0%  RCRI: 0.4%  DASI Risk Score      Flowsheet Row Most Recent Value   DASI SCORE 36.7   METS Score (Will be calculated only when all the questions are answered) 7.3          Caprini DVT Assessment    No data to display       Modified Frailty Index    No data to display       CHADS2 Stroke Risk  Current as of about an hour ago        N/A 3 to 100%: High Risk   2 to < 3%: Medium Risk   0 to < 2%: Low Risk     Last Change: N/A          This score determines the patient's risk of having a stroke if the patient has atrial fibrillation.        This score is not applicable to this patient. Components are not calculated.          Revised Cardiac Risk Index      Flowsheet Row Most Recent Value   Revised Cardiac Risk Calculator 0          Apfel Simplified Score    No data to display       Risk Analysis Index Results This Encounter    No data found in the last 1 encounters.       Stop Bang Score      Flowsheet Row Most Recent Value   Do you snore loudly? 0  [H/O VASU. USES CPAP]   Do you often feel tired or fatigued after your sleep? 0   Has anyone ever observed you " stop breathing in your sleep? 0   Do you have or are you being treated for high blood pressure? 1   Recent BMI (Calculated) 45.4   Is BMI greater than 35 kg/m2? 1=Yes   Age older than 50 years old? 1=Yes   Is your neck circumference greater than 17 inches (Male) or 16 inches (Female)? 0   Gender - Male 0=No   STOP-BANG Total Score 3            Assessment and Plan:     History of polyps: Colonoscopy  Diabetes: Hemoglobin A1C 6.9  7/15/24  Hypothyroid: Thyroidectomy 2013 TSH 7/15/24  1.37  Hypertension  Asthma  VASU: patient states she wears C-PAP  Osteoarthritis  BMI: 45.37      EKG completed in PAT  LABS 7/15/24    ECHO 10/28/19 (scanned in media under wrong date 1/30/23)    Kira Bloom, CLAUDIA-CNP

## 2024-09-19 LAB
ATRIAL RATE: 58 BPM
P AXIS: 23 DEGREES
P OFFSET: 190 MS
P ONSET: 134 MS
PR INTERVAL: 176 MS
Q ONSET: 222 MS
QRS COUNT: 9 BEATS
QRS DURATION: 90 MS
QT INTERVAL: 438 MS
QTC CALCULATION(BAZETT): 429 MS
QTC FREDERICIA: 432 MS
R AXIS: 9 DEGREES
T AXIS: 25 DEGREES
T OFFSET: 441 MS
VENTRICULAR RATE: 58 BPM

## 2024-09-21 DIAGNOSIS — Z76.0 MEDICATION REFILL: ICD-10-CM

## 2024-09-25 ENCOUNTER — HOSPITAL ENCOUNTER (OUTPATIENT)
Dept: GASTROENTEROLOGY | Facility: HOSPITAL | Age: 69
Discharge: HOME | End: 2024-09-25
Payer: MEDICARE

## 2024-09-25 ENCOUNTER — DOCUMENTATION (OUTPATIENT)
Dept: CARDIOLOGY | Facility: HOSPITAL | Age: 69
End: 2024-09-25

## 2024-09-25 ENCOUNTER — ANESTHESIA (OUTPATIENT)
Dept: GASTROENTEROLOGY | Facility: HOSPITAL | Age: 69
End: 2024-09-25
Payer: MEDICARE

## 2024-09-25 ENCOUNTER — ANESTHESIA EVENT (OUTPATIENT)
Dept: GASTROENTEROLOGY | Facility: HOSPITAL | Age: 69
End: 2024-09-25
Payer: MEDICARE

## 2024-09-25 VITALS
HEIGHT: 64 IN | WEIGHT: 264.55 LBS | OXYGEN SATURATION: 98 % | RESPIRATION RATE: 17 BRPM | BODY MASS INDEX: 45.17 KG/M2 | TEMPERATURE: 96.8 F | SYSTOLIC BLOOD PRESSURE: 125 MMHG | HEART RATE: 80 BPM | DIASTOLIC BLOOD PRESSURE: 72 MMHG

## 2024-09-25 DIAGNOSIS — D12.5 ADENOMATOUS POLYP OF SIGMOID COLON: Primary | ICD-10-CM

## 2024-09-25 DIAGNOSIS — Z12.11 COLON CANCER SCREENING: ICD-10-CM

## 2024-09-25 DIAGNOSIS — I48.91 ATRIAL FIBRILLATION, UNSPECIFIED TYPE (MULTI): ICD-10-CM

## 2024-09-25 LAB
GLUCOSE BLD MANUAL STRIP-MCNC: 123 MG/DL (ref 74–99)
GLUCOSE BLD MANUAL STRIP-MCNC: 140 MG/DL (ref 74–99)

## 2024-09-25 PROCEDURE — 2500000004 HC RX 250 GENERAL PHARMACY W/ HCPCS (ALT 636 FOR OP/ED): Performed by: NURSE ANESTHETIST, CERTIFIED REGISTERED

## 2024-09-25 PROCEDURE — 7100000001 HC RECOVERY ROOM TIME - INITIAL BASE CHARGE

## 2024-09-25 PROCEDURE — 7100000002 HC RECOVERY ROOM TIME - EACH INCREMENTAL 1 MINUTE

## 2024-09-25 PROCEDURE — A45380 PR COLONOSCOPY,BIOPSY: Performed by: NURSE ANESTHETIST, CERTIFIED REGISTERED

## 2024-09-25 PROCEDURE — 7100000009 HC PHASE TWO TIME - INITIAL BASE CHARGE

## 2024-09-25 PROCEDURE — 82947 ASSAY GLUCOSE BLOOD QUANT: CPT

## 2024-09-25 PROCEDURE — 99222 1ST HOSP IP/OBS MODERATE 55: CPT | Performed by: NURSE PRACTITIONER

## 2024-09-25 PROCEDURE — A45380 PR COLONOSCOPY,BIOPSY: Performed by: ANESTHESIOLOGY

## 2024-09-25 PROCEDURE — 45380 COLONOSCOPY AND BIOPSY: CPT | Performed by: STUDENT IN AN ORGANIZED HEALTH CARE EDUCATION/TRAINING PROGRAM

## 2024-09-25 PROCEDURE — 3700000001 HC GENERAL ANESTHESIA TIME - INITIAL BASE CHARGE

## 2024-09-25 PROCEDURE — 3700000002 HC GENERAL ANESTHESIA TIME - EACH INCREMENTAL 1 MINUTE

## 2024-09-25 PROCEDURE — 7100000010 HC PHASE TWO TIME - EACH INCREMENTAL 1 MINUTE

## 2024-09-25 PROCEDURE — 2500000004 HC RX 250 GENERAL PHARMACY W/ HCPCS (ALT 636 FOR OP/ED): Performed by: STUDENT IN AN ORGANIZED HEALTH CARE EDUCATION/TRAINING PROGRAM

## 2024-09-25 RX ORDER — FENTANYL CITRATE 50 UG/ML
50 INJECTION, SOLUTION INTRAMUSCULAR; INTRAVENOUS EVERY 5 MIN PRN
Status: ACTIVE | OUTPATIENT
Start: 2024-09-25 | End: 2024-09-25

## 2024-09-25 RX ORDER — SODIUM CHLORIDE, SODIUM LACTATE, POTASSIUM CHLORIDE, CALCIUM CHLORIDE 600; 310; 30; 20 MG/100ML; MG/100ML; MG/100ML; MG/100ML
20 INJECTION, SOLUTION INTRAVENOUS CONTINUOUS
Status: DISCONTINUED | OUTPATIENT
Start: 2024-09-25 | End: 2024-09-26 | Stop reason: HOSPADM

## 2024-09-25 RX ORDER — METOPROLOL SUCCINATE 25 MG/1
25 TABLET, EXTENDED RELEASE ORAL DAILY
Status: DISCONTINUED | OUTPATIENT
Start: 2024-09-25 | End: 2024-09-26 | Stop reason: HOSPADM

## 2024-09-25 RX ORDER — METOPROLOL SUCCINATE 25 MG/1
25 TABLET, EXTENDED RELEASE ORAL DAILY
Qty: 90 TABLET | Refills: 3 | Status: SHIPPED | OUTPATIENT
Start: 2024-09-25

## 2024-09-25 RX ORDER — PROPOFOL 10 MG/ML
INJECTION, EMULSION INTRAVENOUS AS NEEDED
Status: DISCONTINUED | OUTPATIENT
Start: 2024-09-25 | End: 2024-09-25

## 2024-09-25 RX ORDER — FENTANYL CITRATE 50 UG/ML
25 INJECTION, SOLUTION INTRAMUSCULAR; INTRAVENOUS EVERY 5 MIN PRN
Status: ACTIVE | OUTPATIENT
Start: 2024-09-25 | End: 2024-09-25

## 2024-09-25 RX ORDER — ONDANSETRON HYDROCHLORIDE 2 MG/ML
4 INJECTION, SOLUTION INTRAVENOUS ONCE AS NEEDED
Status: DISCONTINUED | OUTPATIENT
Start: 2024-09-25 | End: 2024-09-26 | Stop reason: HOSPADM

## 2024-09-25 RX ORDER — LABETALOL HYDROCHLORIDE 5 MG/ML
5 INJECTION, SOLUTION INTRAVENOUS ONCE AS NEEDED
Status: DISCONTINUED | OUTPATIENT
Start: 2024-09-25 | End: 2024-09-26 | Stop reason: HOSPADM

## 2024-09-25 RX ORDER — SODIUM CHLORIDE, SODIUM LACTATE, POTASSIUM CHLORIDE, CALCIUM CHLORIDE 600; 310; 30; 20 MG/100ML; MG/100ML; MG/100ML; MG/100ML
100 INJECTION, SOLUTION INTRAVENOUS CONTINUOUS
Status: DISCONTINUED | OUTPATIENT
Start: 2024-09-25 | End: 2024-09-26 | Stop reason: HOSPADM

## 2024-09-25 RX ORDER — MIDAZOLAM HYDROCHLORIDE 1 MG/ML
INJECTION, SOLUTION INTRAMUSCULAR; INTRAVENOUS AS NEEDED
Status: DISCONTINUED | OUTPATIENT
Start: 2024-09-25 | End: 2024-09-25

## 2024-09-25 RX ORDER — FENTANYL CITRATE 50 UG/ML
INJECTION, SOLUTION INTRAMUSCULAR; INTRAVENOUS AS NEEDED
Status: DISCONTINUED | OUTPATIENT
Start: 2024-09-25 | End: 2024-09-25

## 2024-09-25 RX ORDER — FLUOXETINE HYDROCHLORIDE 40 MG/1
40 CAPSULE ORAL DAILY
Qty: 90 CAPSULE | Refills: 1 | Status: SHIPPED | OUTPATIENT
Start: 2024-09-25

## 2024-09-25 RX ORDER — LIDOCAINE HYDROCHLORIDE 10 MG/ML
0.1 INJECTION, SOLUTION INFILTRATION; PERINEURAL ONCE
Status: DISCONTINUED | OUTPATIENT
Start: 2024-09-25 | End: 2024-09-26 | Stop reason: HOSPADM

## 2024-09-25 RX ORDER — OXYCODONE HYDROCHLORIDE 5 MG/1
5 TABLET ORAL EVERY 4 HOURS PRN
Status: DISCONTINUED | OUTPATIENT
Start: 2024-09-25 | End: 2024-09-26 | Stop reason: HOSPADM

## 2024-09-25 RX ORDER — SODIUM CHLORIDE, SODIUM LACTATE, POTASSIUM CHLORIDE, CALCIUM CHLORIDE 600; 310; 30; 20 MG/100ML; MG/100ML; MG/100ML; MG/100ML
50 INJECTION, SOLUTION INTRAVENOUS CONTINUOUS
Status: CANCELLED | OUTPATIENT
Start: 2024-09-25

## 2024-09-25 ASSESSMENT — PAIN SCALES - GENERAL

## 2024-09-25 ASSESSMENT — PAIN - FUNCTIONAL ASSESSMENT

## 2024-09-25 NOTE — TELEPHONE ENCOUNTER
Prescription request received and populated   Pharmacy populated  Last Office Visit: 7/12/24 FOR 6 MONTH FOLLOW UP.   Last physical 1/12/24

## 2024-09-25 NOTE — PERIOPERATIVE NURSING NOTE
Patient received to [facility]6 from Endo. Anesthesia at bedside. Report received. Initial assessment complete. VSS on Cardiac Monitor. Patient denies pain at present.

## 2024-09-25 NOTE — NURSING NOTE
Discharge instructions discussed and handed to pt.  Educate  pt on metoprolol and xarelto.   Pt to follow up with dr farmer in office.  Appointment scheduled for sept 30th.  No further questions.

## 2024-09-25 NOTE — PERIOPERATIVE NURSING NOTE
Anesthesia and RN noted Irregular HB Dr Dc notified at bedside. EKG ordered.    0830 EKG done Dr Dc notified.    0833 Dr Dc and Dr Andujar at bedside.     0840  Brett Bobby NP For Cardiology group notified.

## 2024-09-25 NOTE — ANESTHESIA PREPROCEDURE EVALUATION
Patient: Radha Redmond    Procedure Information       Date/Time: 09/25/24 0730    Scheduled providers: Tiera Guzman MD; Eliel Andujar MD; CLAUDIA Solano-CRNA    Procedure: COLONOSCOPY    Location: [facility]            Relevant Problems   Cardiac   (+) Angina at rest (CMS-HCC)   (+) Hypertensive disorder      Pulmonary   (+) Mild intermittent asthma without complication (HHS-HCC)   (+) Obstructive sleep apnea syndrome      Neuro   (+) Anxiety disorder, unspecified   (+) Major depressive disorder, single episode, unspecified      Endocrine   (+) Hypothyroidism   (+) Type 2 diabetes mellitus without complication, without long-term current use of insulin (Multi)      Hematology   (+) Anemia due to acute blood loss   (+) Iron deficiency anemia      Musculoskeletal   (+) Osteoarthritis of left knee     EF 76% per echo 2023.    Clinical information reviewed:   Tobacco  Allergies  Meds   Med Hx  Surg Hx   Fam Hx  Soc Hx        NPO Detail:  NPO/Void Status  Carbohydrate Drink Given Prior to Surgery? : N  Date of Last Liquid: 09/24/24  Time of Last Liquid: 2300  Date of Last Solid: 09/23/24  Time of Last Solid: 1000  Last Intake Type: GI prep  Time of Last Void: 0600         Physical Exam    Airway  Mallampati: III  TM distance: >3 FB  Neck ROM: full     Cardiovascular    Dental    Pulmonary    Abdominal            Anesthesia Plan    History of general anesthesia?: yes  History of complications of general anesthesia?: no    ASA 3     MAC     intravenous induction     Plan discussed with CRNA.

## 2024-09-25 NOTE — CONSULTS
Inpatient consult to Cardiology  Consult performed by: CLAUDIA Pradhan-CNP  Consult ordered by: Eliel Andujar MD  Reason for consult: First diagnosed atrial fibrillation.        History Of Present Illness:    Radha Redmond is a 69 y.o. female presenting with scheduled outpatient colonoscopy.  No current cardiologist, has a appointment scheduled to follow-up with Dr. Chaney next week.  Past medical history of diabetes mellitus type 2, morbid obesity, chronic diastolic heart failure, palpitations, coronary artery disease with mild 50% stenosis to mid left anterior descending artery in 2019 as well as sleep apnea compliant with CPAP.  Patient underwent colonoscopy today and during colonoscopy was noted to have a rhythm change.  This was interpreted as rate controlled atrial fibrillation, the procedure was successfully completed and the patient was transferred to the prepost area.  Upon awakening the patient denies complaints chest pain or pressure palpitations or feeling of rapid heart rate.  Does report palpitations yesterday with bowel prep for colonoscopy.  No laboratory diagnostics have been drawn today.  Most recent creatinine was 0.9. post procedure patient's blood pressure was 119/75 with a room air pulse ox of 97%.     Last Recorded Vitals:  Vitals:    09/25/24 0845 09/25/24 0900 09/25/24 0915 09/25/24 0930   BP: 111/74 106/69 119/71 119/75   BP Location:       Patient Position:       Pulse: 90 100 79 82   Resp: 15 12 12 16   Temp:   36.6 °C (97.9 °F)    TempSrc:   Temporal    SpO2: 99% 95% 95% 94%   Weight:       Height:           Last Labs:  CBC - 7/15/2024: 10:25 AM  6.2 13.9 365    44.2      CMP - 7/15/2024: 10:25 AM  8.8 6.6 18 --- 0.8   4.0 3.9 13 94      PTT - No results in last year.  _   _ _     Hemoglobin A1C   Date/Time Value Ref Range Status   07/15/2024 10:25 AM 6.9 (H) See below % Final   07/18/2023 10:12 AM 5.8 4.0 - 6.0 % Final     Comment:     Hemoglobin A1C levels are  related to mean blood glucose during the   preceding 2-3 months. The relationship table below may be used as a   general guide. Each 1% increase in HGB A1C is a reflection of an   increase in mean glucose of approximately 30 mg/dl.   Reference: Diabetes Care, volume 29, supplement 1 Jan. 2006                        HGB A1C ................. Approx. Mean Glucose   _______________________________________________   6%   ...............................  120 mg/dl   7%   ...............................  150 mg/dl   8%   ...............................  180 mg/dl   9%   ...............................  210 mg/dl   10%  ...............................  240 mg/dl  Performed at [address] [address]     05/02/2023 07:41 PM 6.4 (H) 4.0 - 6.0 % Final     Comment:     Hemoglobin A1C levels are related to mean blood glucose during the   preceding 2-3 months. The relationship table below may be used as a   general guide. Each 1% increase in HGB A1C is a reflection of an   increase in mean glucose of approximately 30 mg/dl.   Reference: Diabetes Care, volume 29, supplement 1 Jan. 2006                        HGB A1C ................. Approx. Mean Glucose   _______________________________________________   6%   ...............................  120 mg/dl   7%   ...............................  150 mg/dl   8%   ...............................  180 mg/dl   9%   ...............................  210 mg/dl   10%  ...............................  240 mg/dl  Performed at [address] [address]       LDL Calculated   Date/Time Value Ref Range Status   07/15/2024 10:25  65 - 130 mg/dL Final   01/15/2024 08:53  65 - 130 mg/dL Final   06/24/2022 08:20  65 - 130 MG/DL Final   12/17/2021 10:08  (H) 65 - 130 MG/DL Final   07/12/2021 08:43  (H) 65 - 130 MG/DL Final      Results for orders placed or performed during the hospital encounter of 09/25/24 (from the past 24  hour(s))   POCT GLUCOSE   Result Value Ref Range    POCT Glucose 140 (H) 74 - 99 mg/dL       Last I/O:  No intake/output data recorded.    Past Cardiology Tests (Last 3 Years):  EKG:  ECG 12 lead (Clinic Performed) 09/25: Rate controlled atrial fibrillation    Echo: January/2023; preserved ejection fraction mild stage I diastolic dysfunction  .  Cath: 2019.  50% stenosis to the mid LAD.  No intervention    Past Medical History:  She has a past medical history of Age-related nuclear cataract of both eyes, Drusen (degenerative) of macula, bilateral, Dry eye syndrome of bilateral lacrimal glands, Personal history of other diseases of the circulatory system, Personal history of other diseases of the circulatory system, Personal history of other diseases of the respiratory system, Personal history of other endocrine, nutritional and metabolic disease, Personal history of other endocrine, nutritional and metabolic disease, Personal history of other malignant neoplasm of skin, Type 2 diabetes mellitus with other diabetic kidney complication (Multi), and Unspecified disorder of refraction.    Past Surgical History:  She has a past surgical history that includes Other surgical history (01/27/2021); Other surgical history (01/27/2021); Other surgical history (01/27/2021); Other surgical history (01/27/2021); Other surgical history (07/12/2021); Other surgical history (2/1989); Other surgical history (07/12/2021); and Colonoscopy w/ biopsies (01/17/2018).      Social History:  She reports that she quit smoking about 36 years ago. Her smoking use included cigarettes. She has never been exposed to tobacco smoke. She has never used smokeless tobacco. She reports current alcohol use of about 1.0 standard drink of alcohol per week. She reports that she does not use drugs.    Family History:  Family History   Problem Relation Name Age of Onset    Diabetes type II Mother      Prostate cancer Father      Arthritis Father      Stroke  Son      Aneurysm Other children         Allergies:  Aspirin, Nsaids (non-steroidal anti-inflammatory drug), Statins-hmg-coa reductase inhibitors, and Ezetimibe    Inpatient Medications:  Scheduled medications   Medication Dose Route Frequency    lidocaine  0.1 mL subcutaneous Once     PRN medications   Medication    fentaNYL PF    fentaNYL PF    labetaloL    meperidine    ondansetron    oxyCODONE    promethazine     Continuous Medications   Medication Dose Last Rate    lactated Ringer's  20 mL/hr 20 mL/hr (09/25/24 0736)    lactated Ringer's  100 mL/hr       Outpatient Medications:  Current Outpatient Medications   Medication Instructions    acetaminophen (TYLENOL) 325 mg, oral, Every 4 hours    albuterol (Ventolin HFA) 90 mcg/actuation inhaler 2 PUFFS AS NEEDED INHALATION EVERY 4 HRS 30 DAY(S)    cholecalciferol (VITAMIN D-3) 25 mcg, oral, Daily    FLUoxetine (PROZAC) 40 mg, oral, Daily    fluticasone (Flonase) 50 mcg/actuation nasal spray INSTILL 2 SPRAYS IN EACH NOSTRIL EVERY DAY PLEASE MAKE AN APPOINTMENT WITH DR. BAER NASALLY DAILY 30 DAYS    fluticasone furoate (Arnuity Ellipta) 100 mcg/actuation inhaler 1 puff, inhalation, Daily    gabapentin (NEURONTIN) 600 mg, oral, 2 times daily    Jardiance 10 mg, oral, Daily    levothyroxine (SYNTHROID, LEVOXYL) 150 mcg, oral, Daily    metFORMIN (Glucophage) 500 mg tablet TAKE 1 TABLET BY MOUTH TWICE A DAY WITH MEALS FOR 90 DAYS    multivit-minerals/folic acid (WOMEN'S MULTIVITAMIN GUMMIES ORAL) oral    spironolactone (Aldactone) 25 mg tablet oral, 2 times daily    torsemide (DEMADEX) 50 mg, oral, Daily       Physical Exam:  General: alert, x 3, very pleasant  HEENT: normal cephalic, atraumatic, no scleral icterus  Neck: No JVD, bruit or thrill, masses or tenderness   Heart: S1/S2, Rate 80, Rhythm irregular, no s3 or s4, no murmur, thrill, or heaves at PMI.   Lungs: Clear, equal expansion and excursion, no wheezes, crackles, rhales or rhonci.  Air.  No conversational  dyspnea previously.  No tachypnea.  No pain with deep serration  Abdomen: Obesity, bowel sounds x 4, soft, non-tender   Genitourinary: deferred   Extremities: No significant upper or lower extremity him appreciated.       Assessment/Plan     Status post colonoscopy  First diagnosed rate controlled atrial fibrillation  Obesity  Diabetes mellitus type 2 in obese  Chronic diastolic heart failure  Hyperlipidemia  Chronic kidney disease stage I  Apnea compliant with CPAP    Overall impression    9/25: As above, first diagnosed rate controlled atrial fibrillation post colonoscopy.  Patient has had a long history of palpitations without significant finding.  Definitive atrial fibrillation EKG. Her resting heart rate is between the 80s and 90s.  I am initiating a low-dose metoprolol succinate which I have informed the patient to take at bedtime as she is normotensive at this time to avoid hypotensive issues while awake.  Her [id number] equals 4.  Anticoagulation is recommended.  Her EGFR is 69.  I have started the patient on rivaroxaban 20 mg daily.  I have thoroughly described to the patient what atrial fibrillation is and her course of treatment.  Will focus initially on ensuring the patient is rate controlled.  She will follow-up with Dr. Chaney in the outpatient setting on 9/30.  At that time we will talk about possibility of outpatient electrical cardioversion in 3 to 4 weeks and perhaps an appointment with electrophysiology to consider a cardiac ablation.  This point the patient is euvolemic and chest pain-free with stable blood pressure and heart rate.  Stable for discharge from the cardiac perspective.  Will sign off.        No Order    I spent 60 minutes in the professional and overall care of this patient.        Giovanni Bobby, APRN-CNP

## 2024-09-25 NOTE — ANESTHESIA POSTPROCEDURE EVALUATION
Patient: Radha Redmond    Procedure Summary       Date: 09/25/24 Room / Location: [facility]    Anesthesia Start: 0736 Anesthesia Stop: 0814    Procedure: COLONOSCOPY Diagnosis:       Colon cancer screening      Adenomatous polyp of sigmoid colon    Scheduled Providers: Tiera Guzman MD; Eliel Andujar MD; CLAUDIA Solano-CRNA Responsible Provider: Eliel Andujar MD    Anesthesia Type: MAC ASA Status: 3            Anesthesia Type: MAC    Vitals Value Taken Time   BP 86/59 09/25/24 0840   Temp 36.0 09/25/24 0842   Pulse 85 09/25/24 0840   Resp 14 09/25/24 0840   SpO2 99 % 09/25/24 0840   Vitals shown include unfiled device data.    Anesthesia Post Evaluation    Patient location during evaluation: PACU  Patient participation: complete - patient participated  Level of consciousness: awake  Pain management: adequate  Airway patency: patent  Cardiovascular status: stable  Respiratory status: acceptable  Hydration status: acceptable  Postoperative Nausea and Vomiting: none  Comments: Called to PACU, patient with evidence of new onset a-fib. EKG confirmed. Patient with rate 80s-90s, SBP in 90s. All other vitals stable. Cardiology called, will follow recommendations. All patient questions answered.        No notable events documented.

## 2024-09-25 NOTE — DISCHARGE INSTRUCTIONS
Instructions  Patient Instructions after a Colonoscopy, Upper GI, and ERCP      The anesthetics, sedatives or narcotics which were given to you today will be acting in your body for the next 24 hours, so you might feel a little sleepy or groggy.  This feeling should slowly wear off. Carefully read and follow the instructions.      You received sedation today:  - Do not drive or operate any machinery or power tools of any kind.   - No alcoholic beverages today, not even beer or wine.  - Do not make any important decisions or sign any legal documents.  - No over the counter medications that contain alcohol or that may cause drowsiness.  - Do not make any important decisions or sign any legal documents.     While it is common to experience mild to moderate abdominal distention, gas, or belching after your procedure, if any of these symptoms occur following discharge from the GI Lab or within one week of having your procedure, call the [facility] to be advised whether a visit to your nearest Urgent Care or Emergency Department is indicated.  Take this paper with you if you go.      - If you develop an allergic reaction to the medications that were given during your procedure such as difficulty breathing, rash, hives, severe nausea, vomiting or lightheadedness.- If you experience chest pain, shortness of breath, severe abdominal pain, fevers and chills.     -If you develop signs and symptoms of bleeding such as blood in your spit, if your stools turn black, tarry, or bloody     - If you have not urinated within 8 hours following your procedure.- If your IV site becomes painful, red, inflamed, or looks infected.     If you received a biopsy/polypectomy/sphincterotomy the following instructions apply below:     - Do not use Aspirin containing products, non-steroidal medications or anti-coagulants for one week following your procedure. (Examples of these types of medications are: Advil, Arthrotec, Aleve,  Coumadin, Ecotrin, Heparin, Ibuprofen, Indocin, Motrin, Naprosyn, Nuprin, Plavix, Vioxx, and Voltarin, or their generic forms.  This list is not all-inclusive.  Check with your physician or pharmacist before resuming medications.)      - Eat a soft diet today.  Avoid foods that are poorly digested for the next 24 hours.  These foods would include: nuts, beans, lettuce, red meats, and fried foods.       - Start with liquids and advance your diet as tolerated, gradually work up to eating solids.      - Do not have a Barium Study or Enema for one week.     Your physician recommends the additional following instructions:     Colonoscopy: Resume your previous diet, continue your present medications, a repeated colonoscopy will be determined based on pathology result. Return to normal activity tomorrow.      Upper GI endoscopy: Resume your previous diet, continue your medications, recommendation to repeat upper endoscopy in three months for follow up of Thomas's ablation. Return to normal activity tomorrow.      ERCP: Recommended a repeat ERCP in eight weeks to exchange a stent. Watch for symptoms of pancreatitis, bleeding, perforation and cholangitis.  Please see Medication Reconciliation Form for new medication/medications prescribed.     If you experience any problems or have any questions following discharge from the GI Lab, please call:  Before 5p.m.  [phone]  After 5p.m.    [phone]     Nurse Signature                                                                        Date___________________                                                                            Patient/Responsible Party Signature                                        Date___________________

## 2024-09-26 LAB
LABORATORY COMMENT REPORT: NORMAL
PATH REPORT.FINAL DX SPEC: NORMAL
PATH REPORT.GROSS SPEC: NORMAL
PATH REPORT.RELEVANT HX SPEC: NORMAL
PATH REPORT.TOTAL CANCER: NORMAL

## 2024-09-29 NOTE — PROGRESS NOTES
Subjective      Chief Complaint   Patient presents with    follow up discuss a fib          She is followed once a year for hypertension she does have chronic renal failure.  She was in the hosp with a colonoscopy and found to be in afib.  She has never had the afib and for years with stress would feel the heart race.  She is not complaining of chest discomfort.  No complaints of PND or orthopnea.  The legs are not swelling on her.  NO palpitaions.  She had a cath in 2019 and mild disease.  She does have sleep apnea and will use the cpap           Review of Systems   Constitutional: Negative. Negative for chills and fever.   HENT: Negative.     Eyes: Negative.    Cardiovascular:  Positive for irregular heartbeat.   Respiratory: Negative.  Negative for cough.    Endocrine: Negative.    Skin: Negative.    Musculoskeletal: Negative.    Gastrointestinal: Negative.    Genitourinary: Negative.    Neurological: Negative.    All other systems reviewed and are negative.       Past Surgical History:   Procedure Laterality Date    COLONOSCOPY W/ BIOPSIES  01/17/2018    Dr. Banks    OTHER SURGICAL HISTORY  01/27/2021    Nasal polypectomy    OTHER SURGICAL HISTORY  01/27/2021    Knee surgery    OTHER SURGICAL HISTORY  01/27/2021    Thyroidectomy    OTHER SURGICAL HISTORY  01/27/2021    Appendectomy    OTHER SURGICAL HISTORY  07/12/2021    Hip surgery    OTHER SURGICAL HISTORY  2/1989    Umbilical Hernia repair    OTHER SURGICAL HISTORY  07/12/2021    Anal surgery        Active Ambulatory Problems     Diagnosis Date Noted    Unspecified disorder of refraction 09/06/2023    Type 2 diabetes mellitus without complication, without long-term current use of insulin (Multi) 09/06/2023    Tear film insufficiency 09/06/2023    Shoulder pain 09/06/2023    Palpitations 09/06/2023    Other chronic pain 09/06/2023    Osteoarthritis of left knee 09/06/2023    Combined forms of age-related cataract of both eyes 09/06/2023    Neuropathy  09/06/2023    Tongue lesion 09/06/2023    Nasal polyps 09/06/2023    Muscle spasm 09/06/2023    Severe obesity (BMI >= 40) (Multi) 09/06/2023    Mild intermittent asthma without complication ([facility]) 09/06/2023    Major depressive disorder, single episode, unspecified 09/06/2023    Loud snoring 09/06/2023    Iron deficiency anemia 09/06/2023    Nodular thyroid disease 09/06/2023    Hypothyroidism 09/06/2023    Hoarseness 09/06/2023    History of basal cell carcinoma 09/06/2023    History of low potassium 09/06/2023    History of adenomatous polyp of colon 09/06/2023    Heart failure (Multi) 09/06/2023    Hypertensive disorder 09/06/2023    Epistaxis 09/06/2023    Diverticulosis large intestine w/o perforation or abscess w/o bleeding 09/06/2023    Daytime somnolence 09/06/2023    Anxiety disorder, unspecified 09/06/2023    Angina at rest (CMS-HCC) 09/06/2023    Anemia due to acute blood loss 09/06/2023    Obstructive sleep apnea syndrome 09/06/2023    Abnormal metabolic state due to diabetes mellitus (Multi) 09/06/2023    Drusen (degenerative) of macula, bilateral 04/26/2024    Chronic atrial fibrillation (Multi) 09/30/2024     Resolved Ambulatory Problems     Diagnosis Date Noted    Retinal drusen 09/06/2023    Injury of head 09/06/2023    Hip injury 09/06/2023    Fracture of neck of femur (Multi) 09/06/2023    Mild sleep apnea 09/06/2023    Apneic episode 09/06/2023    Asthma ([facility]) 09/06/2023    Accidental fall 09/06/2023     Past Medical History:   Diagnosis Date    Age-related nuclear cataract of both eyes     Dry eye syndrome of bilateral lacrimal glands     Personal history of other diseases of the circulatory system     Personal history of other diseases of the circulatory system     Personal history of other diseases of the respiratory system     Personal history of other endocrine, nutritional and metabolic disease     Personal history of other endocrine, nutritional and metabolic disease     Personal  "history of other malignant neoplasm of skin     Type 2 diabetes mellitus with other diabetic kidney complication (Multi)         Visit Vitals  /60   Pulse 50   Wt 118 kg (260 lb)   LMP  (LMP Unknown)   SpO2 98%   BMI 44.63 kg/m²   OB Status Postmenopausal   Smoking Status Former   BSA 2.31 m²        Objective     Constitutional:       Appearance: Healthy appearance.   Neck:      Vascular: No JVR.   Pulmonary:      Effort: Pulmonary effort is normal.      Breath sounds: Normal breath sounds.   Cardiovascular:      PMI at left midclavicular line. Normal rate. Irregularly irregular rhythm. Normal S1. Normal S2.       Murmurs: There is no murmur.      No gallop.  No click. No rub.   Pulses:     Intact distal pulses.   Abdominal:      Palpations: Abdomen is soft.   Musculoskeletal: Normal range of motion. Skin:     General: Skin is warm and dry.   Neurological:      General: No focal deficit present.            Lab Review:         Lab Results   Component Value Date    CHOL 213 (H) 07/15/2024    CHOL 218 (H) 01/15/2024    CHOL 209 (H) 06/24/2022     Lab Results   Component Value Date    HDL 51.0 07/15/2024    HDL 49.0 (L) 01/15/2024    HDL 54 06/24/2022     Lab Results   Component Value Date    LDLCALC 121 07/15/2024    LDLCALC 130 01/15/2024    LDLCALC 122 06/24/2022     Lab Results   Component Value Date    TRIG 207 (H) 07/15/2024    TRIG 194 (H) 01/15/2024    TRIG 163 (H) 06/24/2022     No components found for: \"CHOLHDL\"     Assessment/Plan     Chronic atrial fibrillation (Multi)  She has gone into atrial fib.  Was started on xarelto and beta blockers.  Will get an echo and see afterwards and consider cardioversion.    Hypertensive disorder  Is doing well     "

## 2024-09-30 ENCOUNTER — OFFICE VISIT (OUTPATIENT)
Dept: CARDIOLOGY | Facility: CLINIC | Age: 69
End: 2024-09-30
Payer: MEDICARE

## 2024-09-30 VITALS
OXYGEN SATURATION: 98 % | SYSTOLIC BLOOD PRESSURE: 119 MMHG | WEIGHT: 260 LBS | DIASTOLIC BLOOD PRESSURE: 60 MMHG | HEART RATE: 50 BPM | BODY MASS INDEX: 44.63 KG/M2

## 2024-09-30 DIAGNOSIS — I10 PRIMARY HYPERTENSION: ICD-10-CM

## 2024-09-30 DIAGNOSIS — I48.20 CHRONIC ATRIAL FIBRILLATION (MULTI): Primary | ICD-10-CM

## 2024-09-30 PROCEDURE — 99213 OFFICE O/P EST LOW 20 MIN: CPT | Performed by: INTERNAL MEDICINE

## 2024-09-30 PROCEDURE — 1157F ADVNC CARE PLAN IN RCRD: CPT | Performed by: INTERNAL MEDICINE

## 2024-09-30 PROCEDURE — 1036F TOBACCO NON-USER: CPT | Performed by: INTERNAL MEDICINE

## 2024-09-30 PROCEDURE — 1126F AMNT PAIN NOTED NONE PRSNT: CPT | Performed by: INTERNAL MEDICINE

## 2024-09-30 PROCEDURE — 3044F HG A1C LEVEL LT 7.0%: CPT | Performed by: INTERNAL MEDICINE

## 2024-09-30 PROCEDURE — 3049F LDL-C 100-129 MG/DL: CPT | Performed by: INTERNAL MEDICINE

## 2024-09-30 PROCEDURE — 3078F DIAST BP <80 MM HG: CPT | Performed by: INTERNAL MEDICINE

## 2024-09-30 PROCEDURE — 3074F SYST BP LT 130 MM HG: CPT | Performed by: INTERNAL MEDICINE

## 2024-09-30 ASSESSMENT — PAIN SCALES - GENERAL: PAINLEVEL: 0-NO PAIN

## 2024-09-30 ASSESSMENT — ENCOUNTER SYMPTOMS
EYES NEGATIVE: 1
OCCASIONAL FEELINGS OF UNSTEADINESS: 1
MUSCULOSKELETAL NEGATIVE: 1
LOSS OF SENSATION IN FEET: 0
NEUROLOGICAL NEGATIVE: 1
RESPIRATORY NEGATIVE: 1
GASTROINTESTINAL NEGATIVE: 1
DEPRESSION: 0
IRREGULAR HEARTBEAT: 1
FEVER: 0
COUGH: 0
CHILLS: 0
CONSTITUTIONAL NEGATIVE: 1
ENDOCRINE NEGATIVE: 1

## 2024-09-30 ASSESSMENT — PATIENT HEALTH QUESTIONNAIRE - PHQ9
SUM OF ALL RESPONSES TO PHQ9 QUESTIONS 1 AND 2: 0
1. LITTLE INTEREST OR PLEASURE IN DOING THINGS: NOT AT ALL
2. FEELING DOWN, DEPRESSED OR HOPELESS: NOT AT ALL

## 2024-09-30 NOTE — ASSESSMENT & PLAN NOTE
She has gone into atrial fib.  Was started on xarelto and beta blockers.  Will get an echo and see afterwards and consider cardioversion.

## 2024-10-09 DIAGNOSIS — E11.9 TYPE 2 DIABETES MELLITUS WITHOUT COMPLICATION, WITHOUT LONG-TERM CURRENT USE OF INSULIN (MULTI): ICD-10-CM

## 2024-10-09 RX ORDER — METFORMIN HYDROCHLORIDE 500 MG/1
500 TABLET ORAL
Qty: 180 TABLET | Refills: 1 | Status: SHIPPED | OUTPATIENT
Start: 2024-10-09

## 2024-10-09 NOTE — TELEPHONE ENCOUNTER
Prescription request received and populated   Pharmacy populated  Last Office Visit: 7/12/24 for 6 mo follow up and has upcoming appmt 1/15/25  1/12/24 last cpe

## 2024-10-30 ENCOUNTER — OFFICE VISIT (OUTPATIENT)
Dept: OPHTHALMOLOGY | Facility: CLINIC | Age: 69
End: 2024-10-30
Payer: COMMERCIAL

## 2024-10-30 DIAGNOSIS — H57.89 IRRITATION OF BOTH EYES: ICD-10-CM

## 2024-10-30 DIAGNOSIS — H01.02B SQUAMOUS BLEPHARITIS OF UPPER AND LOWER EYELIDS OF BOTH EYES: ICD-10-CM

## 2024-10-30 DIAGNOSIS — H01.02A SQUAMOUS BLEPHARITIS OF UPPER AND LOWER EYELIDS OF BOTH EYES: ICD-10-CM

## 2024-10-30 DIAGNOSIS — H04.123 DRY EYES: Primary | ICD-10-CM

## 2024-10-30 PROBLEM — E11.9 ABNORMAL METABOLIC STATE DUE TO DIABETES MELLITUS (MULTI): Status: RESOLVED | Noted: 2023-09-06 | Resolved: 2024-10-30

## 2024-10-30 PROCEDURE — 99213 OFFICE O/P EST LOW 20 MIN: CPT | Performed by: OPHTHALMOLOGY

## 2024-10-30 RX ORDER — VIT C/E/ZN/COPPR/LUTEIN/ZEAXAN 250MG-90MG
CAPSULE ORAL
COMMUNITY

## 2024-10-30 ASSESSMENT — TONOMETRY
OS_IOP_MMHG: 17
IOP_METHOD: GOLDMANN APPLANATION
OD_IOP_MMHG: 17

## 2024-10-30 ASSESSMENT — ENCOUNTER SYMPTOMS
NEUROLOGICAL NEGATIVE: 0
ALLERGIC/IMMUNOLOGIC NEGATIVE: 0
OCCASIONAL FEELINGS OF UNSTEADINESS: 1
MUSCULOSKELETAL NEGATIVE: 0
RESPIRATORY NEGATIVE: 0
CONSTITUTIONAL NEGATIVE: 0
PSYCHIATRIC NEGATIVE: 0
CARDIOVASCULAR NEGATIVE: 0
LOSS OF SENSATION IN FEET: 0
DEPRESSION: 0
GASTROINTESTINAL NEGATIVE: 0
ENDOCRINE NEGATIVE: 0
HEMATOLOGIC/LYMPHATIC NEGATIVE: 0
EYES NEGATIVE: 0

## 2024-10-30 ASSESSMENT — PATIENT HEALTH QUESTIONNAIRE - PHQ9
SUM OF ALL RESPONSES TO PHQ9 QUESTIONS 1 AND 2: 0
2. FEELING DOWN, DEPRESSED OR HOPELESS: NOT AT ALL
1. LITTLE INTEREST OR PLEASURE IN DOING THINGS: NOT AT ALL

## 2024-10-30 ASSESSMENT — VISUAL ACUITY
METHOD: SNELLEN - LINEAR
OS_PH_SC: 20/25
OD_SC: 20/80
OS_SC: 20/40
OS_SC+: -1
OD_PH_SC: 20/40
OD_PH_SC+: -1

## 2024-10-30 ASSESSMENT — EXTERNAL EXAM - RIGHT EYE: OD_EXAM: BROW PTOSIS

## 2024-10-30 ASSESSMENT — PAIN SCALES - GENERAL: PAINLEVEL_OUTOF10: 0-NO PAIN

## 2024-10-30 ASSESSMENT — EXTERNAL EXAM - LEFT EYE: OS_EXAM: BROW PTOSIS

## 2024-10-31 ENCOUNTER — APPOINTMENT (OUTPATIENT)
Dept: CARDIOLOGY | Facility: HOSPITAL | Age: 69
End: 2024-10-31
Payer: MEDICARE

## 2024-10-31 ENCOUNTER — HOSPITAL ENCOUNTER (OUTPATIENT)
Dept: CARDIOLOGY | Facility: HOSPITAL | Age: 69
Discharge: HOME | End: 2024-10-31
Payer: MEDICARE

## 2024-10-31 DIAGNOSIS — I48.20 CHRONIC ATRIAL FIBRILLATION (MULTI): ICD-10-CM

## 2024-10-31 LAB
AORTIC VALVE MEAN GRADIENT: 4.7 MMHG
AORTIC VALVE PEAK VELOCITY: 1.5 M/S
AV PEAK GRADIENT: 9 MMHG
AVA (PEAK VEL): 1.58 CM2
AVA (VTI): 1.7 CM2
EJECTION FRACTION APICAL 4 CHAMBER: 55.6
EJECTION FRACTION: 63 %
LEFT ATRIUM VOLUME AREA LENGTH INDEX BSA: 27 ML/M2
LEFT VENTRICLE INTERNAL DIMENSION DIASTOLE: 4.14 CM (ref 3.5–6)
LEFT VENTRICULAR OUTFLOW TRACT DIAMETER: 2.01 CM
LV EJECTION FRACTION BIPLANE: 62 %
MITRAL VALVE E/A RATIO: 1.58
MITRAL VALVE E/E' RATIO: 7.44
RIGHT VENTRICLE FREE WALL PEAK S': 11.78 CM/S
RIGHT VENTRICLE PEAK SYSTOLIC PRESSURE: 32.6 MMHG
TRICUSPID ANNULAR PLANE SYSTOLIC EXCURSION: 2.2 CM

## 2024-10-31 PROCEDURE — 93306 TTE W/DOPPLER COMPLETE: CPT

## 2024-10-31 PROCEDURE — 93306 TTE W/DOPPLER COMPLETE: CPT | Performed by: INTERNAL MEDICINE

## 2024-11-01 NOTE — PROGRESS NOTES
Subjective      Chief Complaint   Patient presents with    1 month follow up          She was seen a month ago she is usually seen once a year for hypertension as well as her chronic renal failure.  He was found that she was in atrial fibrillation.  She had echocardiogram done which showed the left ventricle was normal.  Mild aortic valve regurgitation the left atrium was mildly dilated.  She is feeling well and will feel the heart go fast every so often and will last about 1/2 hour           ROS     Past Surgical History:   Procedure Laterality Date    COLONOSCOPY W/ BIOPSIES  01/17/2018    Dr. Banks    OTHER SURGICAL HISTORY  01/27/2021    Nasal polypectomy    OTHER SURGICAL HISTORY  01/27/2021    Knee surgery    OTHER SURGICAL HISTORY  01/27/2021    Thyroidectomy    OTHER SURGICAL HISTORY  01/27/2021    Appendectomy    OTHER SURGICAL HISTORY  07/12/2021    Hip surgery    OTHER SURGICAL HISTORY  2/1989    Umbilical Hernia repair    OTHER SURGICAL HISTORY  07/12/2021    Anal surgery        Active Ambulatory Problems     Diagnosis Date Noted    Unspecified disorder of refraction 09/06/2023    Type 2 diabetes mellitus without complication, without long-term current use of insulin (Multi) 09/06/2023    Dry eyes 09/06/2023    Shoulder pain 09/06/2023    Palpitations 09/06/2023    Other chronic pain 09/06/2023    Osteoarthritis of left knee 09/06/2023    Combined forms of age-related cataract of both eyes 09/06/2023    Neuropathy 09/06/2023    Tongue lesion 09/06/2023    Nasal polyps 09/06/2023    Muscle spasm 09/06/2023    Severe obesity (BMI >= 40) (Multi) 09/06/2023    Mild intermittent asthma without complication ([facility]) 09/06/2023    Major depressive disorder, single episode, unspecified 09/06/2023    Loud snoring 09/06/2023    Iron deficiency anemia 09/06/2023    Nodular thyroid disease 09/06/2023    Hypothyroidism 09/06/2023    Hoarseness 09/06/2023    History of basal cell carcinoma 09/06/2023    History of low  potassium 09/06/2023    History of adenomatous polyp of colon 09/06/2023    Heart failure 09/06/2023    Hypertensive disorder 09/06/2023    Epistaxis 09/06/2023    Diverticulosis large intestine w/o perforation or abscess w/o bleeding 09/06/2023    Daytime somnolence 09/06/2023    Anxiety disorder, unspecified 09/06/2023    Angina at rest ([facility]-HCC) 09/06/2023    Anemia due to acute blood loss 09/06/2023    Obstructive sleep apnea syndrome 09/06/2023    Drusen (degenerative) of macula, bilateral 04/26/2024    Chronic atrial fibrillation (Multi) 09/30/2024    Irritation of both eyes 10/30/2024    Squamous blepharitis of upper and lower eyelids of both eyes 10/30/2024     Resolved Ambulatory Problems     Diagnosis Date Noted    Retinal drusen 09/06/2023    Injury of head 09/06/2023    Hip injury 09/06/2023    Fracture of neck of femur (Multi) 09/06/2023    Mild sleep apnea 09/06/2023    Apneic episode 09/06/2023    Asthma 09/06/2023    Accidental fall 09/06/2023    Abnormal metabolic state due to diabetes mellitus (Multi) 09/06/2023     Past Medical History:   Diagnosis Date    Age-related nuclear cataract of both eyes     Dry eye syndrome of bilateral lacrimal glands     Personal history of other diseases of the circulatory system     Personal history of other diseases of the circulatory system     Personal history of other diseases of the respiratory system     Personal history of other endocrine, nutritional and metabolic disease     Personal history of other endocrine, nutritional and metabolic disease     Personal history of other malignant neoplasm of skin     Type 2 diabetes mellitus with other diabetic kidney complication         Visit Vitals  /60   Pulse 53   Wt 122 kg (268 lb)   LMP  (LMP Unknown)   SpO2 96%   BMI 46.00 kg/m²   OB Status Postmenopausal   Smoking Status Former   BSA 2.35 m²        Objective     Constitutional:       Appearance: Healthy appearance.   Neck:      Vascular: No JVR.  "  Pulmonary:      Effort: Pulmonary effort is normal.      Breath sounds: Normal breath sounds.   Cardiovascular:      PMI at left midclavicular line. Normal rate. Regular rhythm. Normal S1. Normal S2.       Murmurs: There is no murmur.      No gallop.  No click. No rub.   Pulses:     Intact distal pulses.   Abdominal:      Palpations: Abdomen is soft.   Musculoskeletal: Normal range of motion. Skin:     General: Skin is warm and dry.   Neurological:      General: No focal deficit present.            Lab Review:         Lab Results   Component Value Date    CHOL 213 (H) 07/15/2024    CHOL 218 (H) 01/15/2024    CHOL 209 (H) 06/24/2022     Lab Results   Component Value Date    HDL 51.0 07/15/2024    HDL 49.0 (L) 01/15/2024    HDL 54 06/24/2022     Lab Results   Component Value Date    LDLCALC 121 07/15/2024    LDLCALC 130 01/15/2024    LDLCALC 122 06/24/2022     Lab Results   Component Value Date    TRIG 207 (H) 07/15/2024    TRIG 194 (H) 01/15/2024    TRIG 163 (H) 06/24/2022     No components found for: \"CHOLHDL\"     Assessment/Plan     Chronic atrial fibrillation (Multi)  She remains in the NSR and has rare afib.  Told to take an extra metoprolol if goes into the afib again      Hypertensive disorder  Is controlled     "

## 2024-11-04 ENCOUNTER — OFFICE VISIT (OUTPATIENT)
Dept: CARDIOLOGY | Facility: CLINIC | Age: 69
End: 2024-11-04
Payer: MEDICARE

## 2024-11-04 VITALS
WEIGHT: 268 LBS | SYSTOLIC BLOOD PRESSURE: 121 MMHG | HEART RATE: 53 BPM | DIASTOLIC BLOOD PRESSURE: 60 MMHG | BODY MASS INDEX: 46 KG/M2 | OXYGEN SATURATION: 96 %

## 2024-11-04 DIAGNOSIS — I48.20 CHRONIC ATRIAL FIBRILLATION (MULTI): Primary | ICD-10-CM

## 2024-11-04 DIAGNOSIS — I10 PRIMARY HYPERTENSION: ICD-10-CM

## 2024-11-04 PROCEDURE — 93005 ELECTROCARDIOGRAM TRACING: CPT | Performed by: INTERNAL MEDICINE

## 2024-11-04 PROCEDURE — 93010 ELECTROCARDIOGRAM REPORT: CPT | Performed by: INTERNAL MEDICINE

## 2024-11-04 PROCEDURE — 3044F HG A1C LEVEL LT 7.0%: CPT | Performed by: INTERNAL MEDICINE

## 2024-11-04 PROCEDURE — 1159F MED LIST DOCD IN RCRD: CPT | Performed by: INTERNAL MEDICINE

## 2024-11-04 PROCEDURE — 3049F LDL-C 100-129 MG/DL: CPT | Performed by: INTERNAL MEDICINE

## 2024-11-04 PROCEDURE — 1036F TOBACCO NON-USER: CPT | Performed by: INTERNAL MEDICINE

## 2024-11-04 PROCEDURE — 1126F AMNT PAIN NOTED NONE PRSNT: CPT | Performed by: INTERNAL MEDICINE

## 2024-11-04 PROCEDURE — 99213 OFFICE O/P EST LOW 20 MIN: CPT | Performed by: INTERNAL MEDICINE

## 2024-11-04 PROCEDURE — 1157F ADVNC CARE PLAN IN RCRD: CPT | Performed by: INTERNAL MEDICINE

## 2024-11-04 PROCEDURE — 3074F SYST BP LT 130 MM HG: CPT | Performed by: INTERNAL MEDICINE

## 2024-11-04 PROCEDURE — 3078F DIAST BP <80 MM HG: CPT | Performed by: INTERNAL MEDICINE

## 2024-11-04 ASSESSMENT — ENCOUNTER SYMPTOMS
OCCASIONAL FEELINGS OF UNSTEADINESS: 0
DEPRESSION: 0
LOSS OF SENSATION IN FEET: 0

## 2024-11-04 ASSESSMENT — PAIN SCALES - GENERAL: PAINLEVEL_OUTOF10: 0-NO PAIN

## 2024-11-04 NOTE — ASSESSMENT & PLAN NOTE
She remains in the NSR and has rare afib.  Told to take an extra metoprolol if goes into the afib again

## 2024-11-19 DIAGNOSIS — Z76.0 MEDICATION REFILL: ICD-10-CM

## 2024-11-19 NOTE — TELEPHONE ENCOUNTER
Patient sent web message requesting 90 day suupy of rx    Last refilled 8/14/24 with 0 refills   Last appmt 7/12/24 for 6 mo f/u  Has upcoming CPE scheduled for 1/15/25

## 2024-11-20 RX ORDER — GABAPENTIN 600 MG/1
600 TABLET ORAL 2 TIMES DAILY
Qty: 180 TABLET | Refills: 0 | Status: SHIPPED | OUTPATIENT
Start: 2024-11-20

## 2024-12-23 DIAGNOSIS — I10 BENIGN ESSENTIAL HTN: ICD-10-CM

## 2024-12-23 DIAGNOSIS — G89.29 OTHER CHRONIC PAIN: ICD-10-CM

## 2024-12-23 DIAGNOSIS — J45.909 ASTHMA, UNSPECIFIED ASTHMA SEVERITY, UNSPECIFIED WHETHER COMPLICATED, UNSPECIFIED WHETHER PERSISTENT (HHS-HCC): ICD-10-CM

## 2024-12-23 DIAGNOSIS — Z76.0 MEDICATION REFILL: ICD-10-CM

## 2024-12-23 DIAGNOSIS — I10 ESSENTIAL (PRIMARY) HYPERTENSION: ICD-10-CM

## 2024-12-24 RX ORDER — FLUTICASONE FUROATE 100 UG/1
1 POWDER RESPIRATORY (INHALATION) DAILY
Qty: 30 EACH | Refills: 2 | Status: SHIPPED | OUTPATIENT
Start: 2024-12-24

## 2024-12-24 RX ORDER — LEVOTHYROXINE SODIUM 150 UG/1
150 TABLET ORAL DAILY
Qty: 90 TABLET | Refills: 0 | Status: SHIPPED | OUTPATIENT
Start: 2024-12-24

## 2024-12-24 RX ORDER — TORSEMIDE 100 MG/1
50 TABLET ORAL DAILY
Qty: 45 TABLET | Refills: 0 | Status: SHIPPED | OUTPATIENT
Start: 2024-12-24

## 2024-12-24 RX ORDER — GABAPENTIN 600 MG/1
600 TABLET ORAL 2 TIMES DAILY
Qty: 180 TABLET | Refills: 0 | Status: SHIPPED | OUTPATIENT
Start: 2024-12-24

## 2025-01-08 ENCOUNTER — TELEMEDICINE (OUTPATIENT)
Dept: PRIMARY CARE | Facility: CLINIC | Age: 70
End: 2025-01-08
Payer: MEDICARE

## 2025-01-08 DIAGNOSIS — E11.69 TYPE 2 DIABETES MELLITUS WITH OTHER SPECIFIED COMPLICATION, WITHOUT LONG-TERM CURRENT USE OF INSULIN: ICD-10-CM

## 2025-01-08 DIAGNOSIS — I50.9 HEART FAILURE, UNSPECIFIED HF CHRONICITY, UNSPECIFIED HEART FAILURE TYPE: ICD-10-CM

## 2025-01-08 DIAGNOSIS — B00.1 COLD SORE: Primary | ICD-10-CM

## 2025-01-08 DIAGNOSIS — E66.01 MORBID OBESITY WITH BMI OF 45.0-49.9, ADULT (MULTI): ICD-10-CM

## 2025-01-08 DIAGNOSIS — I48.91 ATRIAL FIBRILLATION, UNSPECIFIED TYPE (MULTI): ICD-10-CM

## 2025-01-08 PROCEDURE — 1123F ACP DISCUSS/DSCN MKR DOCD: CPT | Performed by: NURSE PRACTITIONER

## 2025-01-08 PROCEDURE — 1124F ACP DISCUSS-NO DSCNMKR DOCD: CPT | Performed by: NURSE PRACTITIONER

## 2025-01-08 PROCEDURE — 1159F MED LIST DOCD IN RCRD: CPT | Performed by: NURSE PRACTITIONER

## 2025-01-08 PROCEDURE — 1157F ADVNC CARE PLAN IN RCRD: CPT | Performed by: NURSE PRACTITIONER

## 2025-01-08 PROCEDURE — 1158F ADVNC CARE PLAN TLK DOCD: CPT | Performed by: NURSE PRACTITIONER

## 2025-01-08 PROCEDURE — 1125F AMNT PAIN NOTED PAIN PRSNT: CPT | Performed by: NURSE PRACTITIONER

## 2025-01-08 PROCEDURE — 99214 OFFICE O/P EST MOD 30 MIN: CPT | Performed by: NURSE PRACTITIONER

## 2025-01-08 PROCEDURE — G2211 COMPLEX E/M VISIT ADD ON: HCPCS | Performed by: NURSE PRACTITIONER

## 2025-01-08 PROCEDURE — 1036F TOBACCO NON-USER: CPT | Performed by: NURSE PRACTITIONER

## 2025-01-08 RX ORDER — VALACYCLOVIR HYDROCHLORIDE 1 G/1
2000 TABLET, FILM COATED ORAL 2 TIMES DAILY
Qty: 4 TABLET | Refills: 0 | Status: SHIPPED | OUTPATIENT
Start: 2025-01-08 | End: 2025-01-09

## 2025-01-08 ASSESSMENT — PAIN SCALES - GENERAL: PAINLEVEL_OUTOF10: 5

## 2025-01-08 NOTE — PROGRESS NOTES
With patient's permission this is a telemedicine visit with video and audio.  History Of Present Illness  Radha Redmond is a 69 y.o. female who calls in for Lip Laceration (Pt states she has a cold sore and it is large and getting more swollen. States that abreva, camomile wash and baby shampoo are not working. Started on monday).    HPI 69 year old female with a painful bump on her upper lip, tried vaseline, keeps getting bigger and getting more sores.  Tried abreva, painful up to her eye.  Left upper lip     Past Medical History  She has a past medical history of Age-related nuclear cataract of both eyes, Drusen (degenerative) of macula, bilateral, Dry eye syndrome of bilateral lacrimal glands, Personal history of other diseases of the circulatory system, Personal history of other diseases of the circulatory system, Personal history of other diseases of the respiratory system, Personal history of other endocrine, nutritional and metabolic disease, Personal history of other endocrine, nutritional and metabolic disease, Personal history of other malignant neoplasm of skin, Type 2 diabetes mellitus with other diabetic kidney complication, and Unspecified disorder of refraction.    Medications  Current Outpatient Medications   Medication Instructions    acetaminophen (TYLENOL) 500 mg, Every 4 hours    albuterol (Ventolin HFA) 90 mcg/actuation inhaler 2 PUFFS AS NEEDED INHALATION EVERY 4 HRS 30 DAY(S)    Arnuity Ellipta 100 mcg/actuation inhaler 1 puff, inhalation, Daily    cholecalciferol (VITAMIN D-3) 25 mcg, Daily    fluticasone (Flonase) 50 mcg/actuation nasal spray INSTILL 2 SPRAYS IN EACH NOSTRIL EVERY DAY PLEASE MAKE AN APPOINTMENT WITH DR. BAER NASALLY DAILY 30 DAYS    gabapentin (NEURONTIN) 600 mg, oral, 2 times daily    Jardiance 10 mg, Daily    levothyroxine (SYNTHROID, LEVOXYL) 150 mcg, oral, Daily    metFORMIN (GLUCOPHAGE) 500 mg, oral, 2 times daily (morning and late afternoon)    metoprolol  succinate XL (TOPROL-XL) 25 mg, oral, Daily, Do not crush or chew.    multivit-minerals/folic acid (WOMEN'S MULTIVITAMIN GUMMIES ORAL) Take by mouth. On hold    rivaroxaban (XARELTO) 20 mg, oral, Daily with evening meal, Take with food.    spironolactone (Aldactone) 25 mg tablet oral, 2 times daily    torsemide (DEMADEX) 50 mg, oral, Daily    valACYclovir (VALTREX) 2,000 mg, oral, 2 times daily        Surgical History  She has a past surgical history that includes Other surgical history (01/27/2021); Other surgical history (01/27/2021); Other surgical history (01/27/2021); Other surgical history (01/27/2021); Other surgical history (07/12/2021); Other surgical history (2/1989); Other surgical history (07/12/2021); and Colonoscopy w/ biopsies (01/17/2018).     Social History  She reports that she quit smoking about 37 years ago. Her smoking use included cigarettes. She has never been exposed to tobacco smoke. She has never used smokeless tobacco. She reports current alcohol use of about 1.0 standard drink of alcohol per week. She reports that she does not use drugs.    Family History  Family History   Problem Relation Name Age of Onset    Diabetes type II Mother      Prostate cancer Father      Arthritis Father      Stroke Son      Aneurysm Other children         Allergies  Aspirin, Nsaids (non-steroidal anti-inflammatory drug), Statins-hmg-coa reductase inhibitors, and Ezetimibe    On video:     Appearance: Normal appearance.      Effort: Respiratory effort is normal. Speaking in full sentences. No respiratory distress.     Mood and Affect: Mood normal.         Thought Content: Thought content normal.         Judgment: Judgment normal.      Last Recorded Vitals  There were no vitals taken for this visit.  (Vitals are taken by patient at home, if any reported)    Relevant Results      Assessment/Plan   Radha was seen today for lip laceration.  Diagnoses and all orders for this visit:  Cold sore (Primary)  -      valACYclovir (Valtrex) 1 gram tablet; Take 2 tablets (2,000 mg) by mouth 2 times a day for 1 day.  Heart failure, unspecified HF chronicity, unspecified heart failure type  Comments:  Pt follows with cardiology.  Stable  Atrial fibrillation, unspecified type (Multi)  Comments:  Pt follows with cardiology. Stable  Morbid obesity with BMI of 45.0-49.9, adult (Multi)  Comments:  stable  Type 2 diabetes mellitus with other specified complication, without long-term current use of insulin  Comments:  Current on visits and well conrolled a1c          Counseling    Medication education:              Education:  The patient is counseled regarding potential side-effects of any and all new medications             Understanding:  Patient expressed understanding             Adherence:  No barriers to adherence identified      Dee Choudhary, APRN-CNP

## 2025-01-15 ENCOUNTER — OFFICE VISIT (OUTPATIENT)
Dept: PRIMARY CARE | Facility: CLINIC | Age: 70
End: 2025-01-15
Payer: MEDICARE

## 2025-01-15 VITALS
DIASTOLIC BLOOD PRESSURE: 62 MMHG | BODY MASS INDEX: 44.73 KG/M2 | OXYGEN SATURATION: 97 % | HEART RATE: 55 BPM | HEIGHT: 64 IN | TEMPERATURE: 98.2 F | WEIGHT: 262 LBS | RESPIRATION RATE: 18 BRPM | SYSTOLIC BLOOD PRESSURE: 100 MMHG

## 2025-01-15 DIAGNOSIS — E11.69 TYPE 2 DIABETES MELLITUS WITH OTHER SPECIFIED COMPLICATION, WITHOUT LONG-TERM CURRENT USE OF INSULIN: ICD-10-CM

## 2025-01-15 DIAGNOSIS — Z00.00 ANNUAL PHYSICAL EXAM: Primary | ICD-10-CM

## 2025-01-15 DIAGNOSIS — Z12.31 ENCOUNTER FOR SCREENING MAMMOGRAM FOR MALIGNANT NEOPLASM OF BREAST: ICD-10-CM

## 2025-01-15 LAB — POC HEMOGLOBIN A1C: 6.7 % (ref 4.2–6.5)

## 2025-01-15 PROCEDURE — 1126F AMNT PAIN NOTED NONE PRSNT: CPT | Performed by: NURSE PRACTITIONER

## 2025-01-15 PROCEDURE — 1036F TOBACCO NON-USER: CPT | Performed by: NURSE PRACTITIONER

## 2025-01-15 PROCEDURE — 1124F ACP DISCUSS-NO DSCNMKR DOCD: CPT | Performed by: NURSE PRACTITIONER

## 2025-01-15 PROCEDURE — 3008F BODY MASS INDEX DOCD: CPT | Performed by: NURSE PRACTITIONER

## 2025-01-15 PROCEDURE — 3074F SYST BP LT 130 MM HG: CPT | Performed by: NURSE PRACTITIONER

## 2025-01-15 PROCEDURE — 83036 HEMOGLOBIN GLYCOSYLATED A1C: CPT | Performed by: NURSE PRACTITIONER

## 2025-01-15 PROCEDURE — 1159F MED LIST DOCD IN RCRD: CPT | Performed by: NURSE PRACTITIONER

## 2025-01-15 PROCEDURE — 1160F RVW MEDS BY RX/DR IN RCRD: CPT | Performed by: NURSE PRACTITIONER

## 2025-01-15 PROCEDURE — 1125F AMNT PAIN NOTED PAIN PRSNT: CPT | Performed by: NURSE PRACTITIONER

## 2025-01-15 PROCEDURE — G0439 PPPS, SUBSEQ VISIT: HCPCS | Performed by: NURSE PRACTITIONER

## 2025-01-15 PROCEDURE — 1157F ADVNC CARE PLAN IN RCRD: CPT | Performed by: NURSE PRACTITIONER

## 2025-01-15 PROCEDURE — 3078F DIAST BP <80 MM HG: CPT | Performed by: NURSE PRACTITIONER

## 2025-01-15 PROCEDURE — 1123F ACP DISCUSS/DSCN MKR DOCD: CPT | Performed by: NURSE PRACTITIONER

## 2025-01-15 PROCEDURE — 99215 OFFICE O/P EST HI 40 MIN: CPT | Performed by: NURSE PRACTITIONER

## 2025-01-15 ASSESSMENT — PATIENT HEALTH QUESTIONNAIRE - PHQ9
1. LITTLE INTEREST OR PLEASURE IN DOING THINGS: NOT AT ALL
SUM OF ALL RESPONSES TO PHQ9 QUESTIONS 1 AND 2: 0
2. FEELING DOWN, DEPRESSED OR HOPELESS: NOT AT ALL

## 2025-01-15 ASSESSMENT — PAIN SCALES - GENERAL: PAINLEVEL_OUTOF10: 2

## 2025-01-15 NOTE — PROGRESS NOTES
History Of Present Illness  Radha Redmond is a 69 y.o. female presenting for a wellness exam.    Preventative screenings:  Due for mammo in march    Other medical issues/concerns:   None today     Patient Care Team:  GISELE Cruz as PCP - General (Family Medicine)  GISELE Cruz as PCP - [facility] PCP  GISELE Cruz MD as Referring Physician (Cardiology)  Yosef Figueroa MD as Surgeon (Ophthalmology)  Meghan Harrison MD as Referring Physician (Dermatology)     General health: Good   Exercise: Balanced  Caffeine: one to two cups   Diet: Balanced    Functional ability:   No cognitive impairment observed.    No cognitive impairment reported by patient or family.    ADL screening:  Hearing without difficulties.  Independent in bathing, dressing, walking in home    IADL screening:  Independent in managing finances, grocery shopping, taking medications, doing housework    Home Safety:   [place] Home safety risk factors: No loose rugs, poor lighting, stairs without rails, bathroom with no grab bars    Depression screening:  Patient Health Questionnaire-2 Score: 0     CARDIAC SCREENING   The 10-year ASCVD risk score (Arslan DK, et al., 2019) is: 13.7%    Values used to calculate the score:      Age: 69 years      Sex: Female      Is Non- : No      Diabetic: Yes      Tobacco smoker: No      Systolic Blood Pressure: 100 mmHg      Is BP treated: Yes      HDL Cholesterol: 51 mg/dL      Total Cholesterol: 213 mg/dL       Past Medical History  Patient Active Problem List    Diagnosis Date Noted    Chronic atrial fibrillation (Multi) 09/30/2024    Irritation of both eyes 10/30/2024    Squamous blepharitis of upper and lower eyelids of both eyes 10/30/2024    Drusen (degenerative) of macula, bilateral 04/26/2024    Unspecified disorder of refraction 09/06/2023    Type 2 diabetes mellitus with other specified complication, without  long-term current use of insulin 09/06/2023    Dry eyes 09/06/2023    Shoulder pain 09/06/2023    Palpitations 09/06/2023    Other chronic pain 09/06/2023    Osteoarthritis of left knee 09/06/2023    Combined forms of age-related cataract of both eyes 09/06/2023    Neuropathy 09/06/2023    Tongue lesion 09/06/2023    Nasal polyps 09/06/2023    Muscle spasm 09/06/2023    Severe obesity (BMI >= 40) (Multi) 09/06/2023    Mild intermittent asthma without complication ([facility]) 09/06/2023    Major depressive disorder, single episode, unspecified 09/06/2023    Loud snoring 09/06/2023    Iron deficiency anemia 09/06/2023    Nodular thyroid disease 09/06/2023    Hypothyroidism 09/06/2023    Hoarseness 09/06/2023    History of basal cell carcinoma 09/06/2023    History of low potassium 09/06/2023    History of adenomatous polyp of colon 09/06/2023    Heart failure 09/06/2023    Hypertensive disorder 09/06/2023    Epistaxis 09/06/2023    Diverticulosis large intestine w/o perforation or abscess w/o bleeding 09/06/2023    Daytime somnolence 09/06/2023    Anxiety disorder, unspecified 09/06/2023    Angina at rest (CMS-HCC) 09/06/2023    Anemia due to acute blood loss 09/06/2023    Obstructive sleep apnea syndrome 09/06/2023        Medications  Medications and current supplements were reviewed and recorded.   Does the patient use opiate medications:  no . If yes, do they take medication appropriately: na.  Current Outpatient Medications   Medication Sig Dispense Refill    acetaminophen (Tylenol) 500 mg tablet Take 1 tablet (500 mg) by mouth every 4 hours.      albuterol (Ventolin HFA) 90 mcg/actuation inhaler 2 PUFFS AS NEEDED INHALATION EVERY 4 HRS 30 DAY(S) 1 g 5    Arnuity Ellipta 100 mcg/actuation inhaler INHALE 1 PUFF BY MOUTH EVERY DAY 30 each 2    cholecalciferol (Vitamin D-3) 25 MCG (1000 UT) tablet Take 1 tablet (25 mcg) by mouth once daily.      fluticasone (Flonase) 50 mcg/actuation nasal spray INSTILL 2 SPRAYS IN  EACH NOSTRIL EVERY DAY PLEASE MAKE AN APPOINTMENT WITH DR. BAER NASALLY DAILY 30 DAYS 48 mL 1    gabapentin (Neurontin) 600 mg tablet TAKE 1 TABLET BY MOUTH TWICE A  tablet 0    Jardiance 10 mg Take 1 tablet (10 mg) by mouth once daily.      levothyroxine (Synthroid, Levoxyl) 150 mcg tablet TAKE 1 TABLET BY MOUTH EVERY DAY 90 tablet 0    metFORMIN (Glucophage) 500 mg tablet TAKE 1 TABLET BY MOUTH TWICE A DAY WITH MEALS 180 tablet 1    metoprolol succinate XL (Toprol-XL) 25 mg 24 hr tablet Take 1 tablet (25 mg) by mouth once daily. Do not crush or chew. 90 tablet 3    multivit-minerals/folic acid (WOMEN'S MULTIVITAMIN GUMMIES ORAL) Take by mouth. On hold      rivaroxaban (Xarelto) 20 mg tablet Take 1 tablet (20 mg) by mouth once daily in the evening. Take with meals. Take with food. 90 tablet 3    spironolactone (Aldactone) 25 mg tablet TAKE 1/2 TABLET BY MOUTH TWICE A DAY 90 tablet 1    torsemide (Demadex) 100 mg tablet TAKE 1/2 TABLET BY MOUTH EVERY DAY 45 tablet 0    valACYclovir (Valtrex) 1 gram tablet Take 2 tablets (2,000 mg) by mouth 2 times a day for 1 day. 4 tablet 0     No current facility-administered medications for this visit.        Surgical History  She has a past surgical history that includes Other surgical history (01/27/2021); Other surgical history (01/27/2021); Other surgical history (01/27/2021); Other surgical history (01/27/2021); Other surgical history (07/12/2021); Other surgical history (2/1989); Other surgical history (07/12/2021); Colonoscopy w/ biopsies (01/17/2018); and Appendectomy.     Social History  She reports that she quit smoking about 37 years ago. Her smoking use included cigarettes. She has never been exposed to tobacco smoke. She has never used smokeless tobacco. She reports current alcohol use of about 1.0 standard drink of alcohol per week. She reports that she does not use drugs.    Family History  Family History   Problem Relation Name Age of Onset    Diabetes  "type II Mother Linda Hicks     Diabetes Mother Linda Hicks     Prostate cancer Father Vasilis     Arthritis Father Vasilis     Stroke Son Mark     Aneurysm Other children         Allergies  Aspirin, Nsaids (non-steroidal anti-inflammatory drug), Statins-hmg-coa reductase inhibitors, and Ezetimibe    Immunizations  Immunization History   Administered Date(s) Administered    Flu vaccine (IIV4), preservative free *Check age/dose* 10/06/2016, 10/16/2019    Flu vaccine, quadrivalent, high-dose, preservative free, age 65y+ (FLUZONE) 09/17/2021, 09/19/2021, 09/28/2023    Flu vaccine, trivalent, preservative free, HIGH-DOSE, age 65y+ (Fluzone) 10/02/2024    Influenza, Unspecified 11/09/1995, 10/17/1996, 10/09/1997, 11/18/1998, 11/08/1999, 12/09/2008, 09/30/2009, 10/05/2010, 11/05/2011, 09/18/2012, 10/18/2013    Influenza, injectable, quadrivalent 10/24/2015, 10/13/2017, 10/10/2018, 09/23/2020, 09/25/2022    Moderna COVID-19 vaccine, 12 years and older (50mcg/0.5mL)(Spikevax) 10/02/2024    Moderna SARS-CoV-2 Vaccination 01/21/2021, 02/25/2021, 11/08/2021    Pfizer COVID-19 vaccine, 12 years and older, (30mcg/0.3mL) (Comirnaty) 09/28/2023    Pfizer COVID-19 vaccine, bivalent, age 12 years and older (30 mcg/0.3 mL) 09/25/2022    Pfizer Gray Cap SARS-CoV-2 06/21/2022    Pneumococcal conjugate vaccine, 13-valent (PREVNAR 13) 12/09/2015    Pneumococcal polysaccharide vaccine, 23-valent, age 2 years and older (PNEUMOVAX 23) 07/31/1995, 03/13/2009, 12/21/2020    Tdap vaccine, age 7 year and older (BOOSTRIX, ADACEL) 03/13/2009, 04/10/2019        ROS  Negative, except as discussed in HPI     Vitals  /62 (BP Location: Right arm, Patient Position: Sitting, BP Cuff Size: Adult)   Pulse 55   Temp 36.8 °C (98.2 °F)   Resp 18   Ht 1.626 m (5' 4\")   Wt 119 kg (262 lb)   LMP  (LMP Unknown)   SpO2 97%   BMI 44.97 kg/m²      Physical Exam  Vitals and nursing note reviewed.   Constitutional:       Appearance: Normal " appearance.   HENT:      Head: Normocephalic and atraumatic.      Right Ear: Tympanic membrane, ear canal and external ear normal.      Left Ear: Tympanic membrane, ear canal and external ear normal.      Nose: Nose normal.      Mouth/Throat:      Mouth: Mucous membranes are moist.      Pharynx: Oropharynx is clear.   Eyes:      Extraocular Movements: Extraocular movements intact.      Conjunctiva/sclera: Conjunctivae normal.      Pupils: Pupils are equal, round, and reactive to light.   Neck:      Thyroid: No thyromegaly.   Cardiovascular:      Rate and Rhythm: Normal rate and regular rhythm.      Pulses: Normal pulses.      Heart sounds: Normal heart sounds, S1 normal and S2 normal.   Pulmonary:      Effort: Pulmonary effort is normal.      Breath sounds: Normal breath sounds. No wheezing or rhonchi.   Abdominal:      General: Bowel sounds are normal.      Palpations: Abdomen is soft. There is no mass.      Tenderness: There is no abdominal tenderness. There is no guarding.   Genitourinary:     Comments: Not examined  Musculoskeletal:         General: Normal range of motion.      Cervical back: Normal range of motion.      Right lower leg: No edema.      Left lower leg: No edema.   Lymphadenopathy:      Cervical: No cervical adenopathy.   Skin:     General: Skin is warm and dry.      Capillary Refill: Capillary refill takes less than 2 seconds.      Findings: No rash.   Neurological:      General: No focal deficit present.      Mental Status: She is alert and oriented to person, place, and time. Mental status is at baseline.      Cranial Nerves: Cranial nerves 2-12 are intact. No cranial nerve deficit.      Sensory: Sensation is intact.      Motor: Motor function is intact.      Coordination: Coordination is intact.      Gait: Gait is intact.   Psychiatric:         Mood and Affect: Mood normal.         Behavior: Behavior normal.         Thought Content: Thought content normal.         Judgment: Judgment normal.          Relevant Results  Lab Results   Component Value Date    WBC 6.2 07/15/2024    WBC 6.0 01/02/2024    HGB 13.9 07/15/2024    HGB 13.0 01/02/2024    HCT 44.2 07/15/2024    HCT 41.0 01/02/2024    MCV 92 07/15/2024    MCV 92 01/02/2024     07/15/2024     01/02/2024     Lab Results   Component Value Date     07/15/2024     03/05/2024    K 4.9 07/15/2024    K 4.6 03/05/2024    CL 99 07/15/2024     03/05/2024    CO2 23 (L) 07/15/2024    CO2 28 03/05/2024    BUN 20 07/15/2024    BUN 23 03/05/2024    CREATININE 0.90 07/15/2024    CREATININE 1.00 03/05/2024    CALCIUM 8.8 07/15/2024    CALCIUM 9.0 03/05/2024    PROT 6.6 07/15/2024    PROT 6.5 01/15/2024    BILITOT 0.8 07/15/2024    BILITOT 0.6 01/15/2024    ALKPHOS 94 07/15/2024    ALKPHOS 88 01/15/2024    ALT 13 07/15/2024    ALT 11 01/15/2024    AST 18 07/15/2024    AST 14 01/15/2024    GLUCOSE 128 (H) 07/15/2024    GLUCOSE 124 (H) 03/05/2024     Lab Results   Component Value Date    HGBA1C 6.7 (A) 01/15/2025     Lab Results   Component Value Date    TSH 1.37 07/15/2024      Lab Results   Component Value Date    CHOL 213 (H) 07/15/2024    TRIG 207 (H) 07/15/2024    HDL 51.0 07/15/2024           Assessment/Plan   Radha was seen today for annual exam.  Diagnoses and all orders for this visit:  Annual physical exam (Primary)  -     Comprehensive Metabolic Panel; Future  -     Lipid Panel; Future  Encounter for screening mammogram for malignant neoplasm of breast  -     BI mammo bilateral screening tomosynthesis; Future  Type 2 diabetes mellitus with other specified complication, without long-term current use of insulin  -     POCT glycosylated hemoglobin (Hb A1C) manually resulted       There are no discontinued medications.     Counseling:   Medication education:   -Education:  The patient is counseled regarding potential side-effects of any and all new medications  -Understanding:  Patient expressed understanding of information  discussed today  -Adherence:  No barriers to adherence identified    Advanced care planning: Discussed including healthcare power of  as well as specific end-of-life choices and/or directives was discussed with the patient , voluntarily, and advance care decision was documented in the patient's record.     Final treatment plan is a result of shared decision making with patient.         Dee Choudhary, APRN-CNP         Tobacco/Alcohol/Opioid use, as well as Illicit Drug Use was screened for/reviewed and documented in Social Documentation section of the chart and medication list as appropriate    Depression Screening  Depression screening completed using the PHQ-2 questions with results documented in the chart/encounter (15m).  (See Rooming Screening section for documentation, or note for additional information)    Cardiac Risk Assessment  Cardiovascular risk was discussed and, if needed, lifestyle modifications recommended, including nutritional choices, exercise, and elimination of habits contributing to risk.   We agreed on a plan to reduce the current cardiovascular risk based on above discussion as needed.     Aspirin use/disuse was discussed and documented in the Problem List of the medical record (under Cardiac Risk Counseling) after reviewing the updated guidelines below:  Consider low dose Aspirin ( mg) use if the benefit for cardiovascular disease prevention outweighs risk for bleeding complications.   In general, low dose ASA should be considered:  In patients WITHOUT prior MI/stroke/PAD (primary prevention):   a. Age <60: Use if 10-year cardiovascular disease risk >20%, with discussion of risks and benefits with patient  b. Age 60-<70: Use if 10-year cardiovascular disease risk >20% and low bleeding (e.g., gastrointestinal) risk, with discussion of risks and benefits with patient  c. Age >=70: Do not use    In patients WITH prior MI/stroke/PAD (secondary prevention):   Generally use unless  extremely high bleeding (e.g., gastrointenstinal) risk, with discussion of risks and benefits with patient    Advance Directives Discussion  Advanced Care Planning (including a Living Will, Healthcare POA, as well as specific end of life choices and/or directives), was discussed with the patient and/or surrogate, voluntarily, and details of that discussion documented in the Problem List (under Advanced Directives Discussion) of the medical record.    (~16 min spent discussing above)     During the course of the visit the patient was educated and counseled about age appropriate screening and preventive services. Completed preventive screenings were documented in the chart and orders were placed for outstanding screenings/procedures as documented in the Assessment and Plan.    Patient Instructions (the written plan) was given to the patient at check out.

## 2025-01-20 ENCOUNTER — LAB (OUTPATIENT)
Dept: LAB | Facility: LAB | Age: 70
End: 2025-01-20
Payer: MEDICARE

## 2025-01-20 DIAGNOSIS — Z00.00 ANNUAL PHYSICAL EXAM: ICD-10-CM

## 2025-01-20 DIAGNOSIS — N18.30 CHRONIC KIDNEY DISEASE, STAGE 3 UNSPECIFIED (MULTI): Primary | ICD-10-CM

## 2025-01-20 LAB
ALBUMIN SERPL BCP-MCNC: 3.8 G/DL (ref 3.4–5)
ALP SERPL-CCNC: 82 U/L (ref 33–136)
ALT SERPL W P-5'-P-CCNC: 11 U/L (ref 7–45)
ANION GAP SERPL CALCULATED.3IONS-SCNC: 13 MMOL/L (ref 10–20)
AST SERPL W P-5'-P-CCNC: 14 U/L (ref 9–39)
BILIRUB SERPL-MCNC: 0.9 MG/DL (ref 0–1.2)
BUN SERPL-MCNC: 28 MG/DL (ref 6–23)
CALCIUM SERPL-MCNC: 8.5 MG/DL (ref 8.6–10.3)
CHLORIDE SERPL-SCNC: 101 MMOL/L (ref 98–107)
CHOLEST SERPL-MCNC: 232 MG/DL (ref 0–199)
CHOLEST/HDLC SERPL: 4.9 {RATIO}
CO2 SERPL-SCNC: 32 MMOL/L (ref 21–32)
CREAT SERPL-MCNC: 1.2 MG/DL (ref 0.5–1.05)
EGFRCR SERPLBLD CKD-EPI 2021: 49 ML/MIN/1.73M*2
GLUCOSE SERPL-MCNC: 132 MG/DL (ref 74–99)
HDLC SERPL-MCNC: 47.5 MG/DL
LDLC SERPL CALC-MCNC: 151 MG/DL
NON HDL CHOLESTEROL: 185 MG/DL (ref 0–149)
PHOSPHATE SERPL-MCNC: 4.6 MG/DL (ref 2.5–4.9)
POTASSIUM SERPL-SCNC: 4.6 MMOL/L (ref 3.5–5.3)
PROT SERPL-MCNC: 6.5 G/DL (ref 6.4–8.2)
SODIUM SERPL-SCNC: 141 MMOL/L (ref 136–145)
TRIGL SERPL-MCNC: 167 MG/DL (ref 0–149)
VLDL: 33 MG/DL (ref 0–40)

## 2025-01-20 PROCEDURE — 84100 ASSAY OF PHOSPHORUS: CPT

## 2025-01-20 PROCEDURE — 80061 LIPID PANEL: CPT

## 2025-01-20 PROCEDURE — 80053 COMPREHEN METABOLIC PANEL: CPT

## 2025-02-03 ENCOUNTER — TELEPHONE (OUTPATIENT)
Dept: PRIMARY CARE | Facility: CLINIC | Age: 70
End: 2025-02-03
Payer: MEDICARE

## 2025-02-03 NOTE — TELEPHONE ENCOUNTER
PT NEEDS NEW RX FOR DULOXETINE 40 MG SENT TO [facility] STATES IT WAS REMOVED HAS ONE PILL LEFT CONTACT PT ON HER CELL PHONE

## 2025-02-04 ENCOUNTER — TELEPHONE (OUTPATIENT)
Dept: PRIMARY CARE | Facility: CLINIC | Age: 70
End: 2025-02-04
Payer: MEDICARE

## 2025-02-04 RX ORDER — FLUOXETINE HYDROCHLORIDE 40 MG/1
40 CAPSULE ORAL DAILY
COMMUNITY

## 2025-02-04 NOTE — TELEPHONE ENCOUNTER
PT CALLING AGAIN TODAY REGARDING REFILL OF DULOXETINE STATES SHE ONLY HAS ONE PILL LEFT REQUESTS CALL BACK ON CELL PHONE

## 2025-02-09 ENCOUNTER — PATIENT MESSAGE (OUTPATIENT)
Dept: PRIMARY CARE | Facility: CLINIC | Age: 70
End: 2025-02-09
Payer: MEDICARE

## 2025-02-09 DIAGNOSIS — F32.4 MAJOR DEPRESSIVE DISORDER WITH SINGLE EPISODE, IN PARTIAL REMISSION (CMS-HCC): Primary | ICD-10-CM

## 2025-02-10 RX ORDER — FLUOXETINE HYDROCHLORIDE 40 MG/1
40 CAPSULE ORAL DAILY
Qty: 90 CAPSULE | Refills: 1 | Status: SHIPPED | OUTPATIENT
Start: 2025-02-10 | End: 2025-08-09

## 2025-02-11 ENCOUNTER — TELEPHONE (OUTPATIENT)
Dept: CARDIOLOGY | Facility: CLINIC | Age: 70
End: 2025-02-11
Payer: MEDICARE

## 2025-02-11 NOTE — TELEPHONE ENCOUNTER
Patient called the office today stating that at her appt with you on 11/4/24 her prozac rx was cancelled by you, reason was Med List Clean-up.  Her PCP re ordered the med but wanted to check with you that it is okay for her to take it?  Please advise, thanks  Call back [phone]    Spoke with patient, she expressed understanding.

## 2025-03-03 ENCOUNTER — HOSPITAL ENCOUNTER (OUTPATIENT)
Dept: RADIOLOGY | Facility: HOSPITAL | Age: 70
Discharge: HOME | End: 2025-03-03
Payer: MEDICARE

## 2025-03-03 DIAGNOSIS — Z12.31 ENCOUNTER FOR SCREENING MAMMOGRAM FOR MALIGNANT NEOPLASM OF BREAST: ICD-10-CM

## 2025-03-03 PROCEDURE — 77063 BREAST TOMOSYNTHESIS BI: CPT

## 2025-03-03 PROCEDURE — 77067 SCR MAMMO BI INCL CAD: CPT | Performed by: STUDENT IN AN ORGANIZED HEALTH CARE EDUCATION/TRAINING PROGRAM

## 2025-03-03 PROCEDURE — 77063 BREAST TOMOSYNTHESIS BI: CPT | Performed by: STUDENT IN AN ORGANIZED HEALTH CARE EDUCATION/TRAINING PROGRAM

## 2025-03-22 DIAGNOSIS — I10 BENIGN ESSENTIAL HTN: ICD-10-CM

## 2025-03-24 DIAGNOSIS — I10 ESSENTIAL (PRIMARY) HYPERTENSION: ICD-10-CM

## 2025-03-24 RX ORDER — FLUTICASONE PROPIONATE 50 MCG
SPRAY, SUSPENSION (ML) NASAL
Qty: 48 ML | Refills: 1 | Status: SHIPPED | OUTPATIENT
Start: 2025-03-24

## 2025-03-24 RX ORDER — TORSEMIDE 100 MG/1
50 TABLET ORAL DAILY
Qty: 45 TABLET | Refills: 0 | Status: SHIPPED | OUTPATIENT
Start: 2025-03-24

## 2025-03-24 NOTE — TELEPHONE ENCOUNTER
Prescription request received and populated   Pharmacy populated  Last Office Visit: 1/15/25 fro physical  Has upcoming appmt 7/16/25 for 6 mo follow up

## 2025-04-01 DIAGNOSIS — E11.9 TYPE 2 DIABETES MELLITUS WITHOUT COMPLICATION, WITHOUT LONG-TERM CURRENT USE OF INSULIN: ICD-10-CM

## 2025-04-01 RX ORDER — METFORMIN HYDROCHLORIDE 500 MG/1
500 TABLET ORAL
Qty: 180 TABLET | Refills: 1 | Status: SHIPPED | OUTPATIENT
Start: 2025-04-01

## 2025-04-11 DIAGNOSIS — Z76.0 MEDICATION REFILL: ICD-10-CM

## 2025-04-14 RX ORDER — SPIRONOLACTONE 25 MG/1
TABLET ORAL 2 TIMES DAILY
Qty: 90 TABLET | Refills: 1 | Status: SHIPPED | OUTPATIENT
Start: 2025-04-14

## 2025-04-29 ENCOUNTER — APPOINTMENT (OUTPATIENT)
Dept: OPHTHALMOLOGY | Facility: CLINIC | Age: 70
End: 2025-04-29
Payer: MEDICARE

## 2025-05-01 ENCOUNTER — APPOINTMENT (OUTPATIENT)
Facility: CLINIC | Age: 70
End: 2025-05-01
Payer: MEDICARE

## 2025-05-04 PROBLEM — I35.1 NONRHEUMATIC AORTIC VALVE REGURGITATION: Status: ACTIVE | Noted: 2025-05-04

## 2025-05-04 NOTE — ASSESSMENT & PLAN NOTE
Reviewed labs from January: Sodium 141, potassium 4.6, creatinine 1.2 with a GFR of 49.  Blood pressure currently well-controlled on current regimen of spironolactone 25 mg twice daily, metoprolol succinate 25 mg daily.  No adjustment needed at this time.  Patient does see Dr. Londono for kidney surveillance as well.

## 2025-05-07 DIAGNOSIS — I10 ESSENTIAL (PRIMARY) HYPERTENSION: ICD-10-CM

## 2025-05-07 DIAGNOSIS — I10 BENIGN ESSENTIAL HTN: ICD-10-CM

## 2025-05-07 RX ORDER — TORSEMIDE 100 MG/1
50 TABLET ORAL DAILY
Qty: 45 TABLET | Refills: 0 | Status: SHIPPED | OUTPATIENT
Start: 2025-05-07

## 2025-05-07 RX ORDER — LEVOTHYROXINE SODIUM 150 UG/1
150 TABLET ORAL DAILY
Qty: 90 TABLET | Refills: 0 | Status: SHIPPED | OUTPATIENT
Start: 2025-05-07

## 2025-05-07 NOTE — TELEPHONE ENCOUNTER
Pt last physical was 1/15/2025 which pt pharm is requesting a refill on torsemide and levothyroxine which was last filled on 3/24/2025 90day supply with no refills. Please advise.

## 2025-05-12 ENCOUNTER — OFFICE VISIT (OUTPATIENT)
Facility: CLINIC | Age: 70
End: 2025-05-12
Payer: MEDICARE

## 2025-05-12 VITALS
SYSTOLIC BLOOD PRESSURE: 124 MMHG | OXYGEN SATURATION: 98 % | WEIGHT: 269 LBS | BODY MASS INDEX: 46.17 KG/M2 | HEART RATE: 55 BPM | DIASTOLIC BLOOD PRESSURE: 68 MMHG

## 2025-05-12 DIAGNOSIS — I10 PRIMARY HYPERTENSION: ICD-10-CM

## 2025-05-12 DIAGNOSIS — I48.20 CHRONIC ATRIAL FIBRILLATION (MULTI): Primary | ICD-10-CM

## 2025-05-12 DIAGNOSIS — I35.1 NONRHEUMATIC AORTIC VALVE REGURGITATION: ICD-10-CM

## 2025-05-12 PROCEDURE — 99213 OFFICE O/P EST LOW 20 MIN: CPT | Performed by: INTERNAL MEDICINE

## 2025-05-12 PROCEDURE — 1126F AMNT PAIN NOTED NONE PRSNT: CPT | Performed by: INTERNAL MEDICINE

## 2025-05-12 PROCEDURE — 1159F MED LIST DOCD IN RCRD: CPT | Performed by: INTERNAL MEDICINE

## 2025-05-12 PROCEDURE — 3044F HG A1C LEVEL LT 7.0%: CPT | Performed by: INTERNAL MEDICINE

## 2025-05-12 PROCEDURE — 3050F LDL-C >= 130 MG/DL: CPT | Performed by: INTERNAL MEDICINE

## 2025-05-12 PROCEDURE — 3078F DIAST BP <80 MM HG: CPT | Performed by: INTERNAL MEDICINE

## 2025-05-12 PROCEDURE — 3074F SYST BP LT 130 MM HG: CPT | Performed by: INTERNAL MEDICINE

## 2025-05-12 PROCEDURE — 1036F TOBACCO NON-USER: CPT | Performed by: INTERNAL MEDICINE

## 2025-05-12 ASSESSMENT — ENCOUNTER SYMPTOMS
SHORTNESS OF BREATH: 0
OCCASIONAL FEELINGS OF UNSTEADINESS: 1
COUGH: 0
BLURRED VISION: 0
PARESTHESIAS: 0
PALPITATIONS: 0
DYSURIA: 0
MUSCLE CRAMPS: 1
LOSS OF SENSATION IN FEET: 0
DYSPNEA ON EXERTION: 0
NUMBNESS: 0
HEMATURIA: 0
ABDOMINAL PAIN: 0
DEPRESSION: 0

## 2025-05-12 ASSESSMENT — LIFESTYLE VARIABLES: TOTAL SCORE: 0

## 2025-05-12 ASSESSMENT — PAIN SCALES - GENERAL: PAINLEVEL_OUTOF10: 0-NO PAIN

## 2025-05-12 NOTE — PROGRESS NOTES
Subjective   Radha Redmond is a 70 y.o. female.    Chief Complaint:  6 month follow up    HPI  Previous patient of Dr. Chaney who reports for a routine cardiac follow-up visit.  She has a history of longstanding atrial fibrillation and is on a rate control strategy with metoprolol succinate as beta-blocker and Xarelto as anticoagulation and stroke prevention.  Overall doing relatively well.  No unusual shortness of breath symptoms.  She does have chronic edema in the lower extremities and takes diuretic therapy.     Review of Systems   Constitutional: Negative for malaise/fatigue.   HENT:  Negative for congestion.    Eyes:  Negative for blurred vision.   Cardiovascular:  Negative for chest pain, dyspnea on exertion and palpitations.   Respiratory:  Negative for cough and shortness of breath.    Musculoskeletal:  Positive for muscle cramps. Negative for joint pain.   Gastrointestinal:  Negative for abdominal pain.   Genitourinary:  Negative for dysuria and hematuria.   Neurological:  Negative for numbness and paresthesias.       Objective   Constitutional:       Appearance: Not in distress.   Eyes:      Conjunctiva/sclera: Conjunctivae normal.   Neck:      Vascular: JVD normal.   Pulmonary:      Breath sounds: Normal breath sounds. No wheezing. No rhonchi. No rales.   Cardiovascular:      Normal rate. Regular rhythm.      Murmurs: There is no murmur.      No gallop.  No click. No rub.   Abdominal:      Palpations: Abdomen is soft.   Neurological:      General: No focal deficit present.      Mental Status: Alert.         Lab Review:       Assessment/Plan   The primary encounter diagnosis was Chronic atrial fibrillation (Multi). Diagnoses of Primary hypertension and Nonrheumatic aortic valve regurgitation were also pertinent to this visit.    Hypertensive disorder  Reviewed labs from January: Sodium 141, potassium 4.6, creatinine 1.2 with a GFR of 49.  Blood pressure currently well-controlled on current regimen of  spironolactone 25 mg twice daily, metoprolol succinate 25 mg daily.  No adjustment needed at this time.  Patient does see Dr. Londono for kidney surveillance as well.    Nonrheumatic aortic valve regurgitation  Only a mild degree of aortic valve regurgitation on her echocardiogram in October 2024.  Lung fields are clear and only mild shortness of breath symptoms.  Will plan on repeating an echocardiogram approximately October 2026 and reassess degree of aortic valve insufficiency.    Chronic atrial fibrillation (Multi)  Appears to be compensated.  Heart rate controlled with low-dose beta-blocker therapy in the form of metoprolol succinate 25 mg daily.  Continue the Xarelto 20 mg daily as anticoagulation and stroke prevention.

## 2025-05-12 NOTE — ASSESSMENT & PLAN NOTE
Appears to be compensated.  Heart rate controlled with low-dose beta-blocker therapy in the form of metoprolol succinate 25 mg daily.  Continue the Xarelto 20 mg daily as anticoagulation and stroke prevention.

## 2025-05-12 NOTE — ASSESSMENT & PLAN NOTE
Only a mild degree of aortic valve regurgitation on her echocardiogram in October 2024.  Lung fields are clear and only mild shortness of breath symptoms.  Will plan on repeating an echocardiogram approximately October 2026 and reassess degree of aortic valve insufficiency.

## 2025-06-03 DIAGNOSIS — I10 ESSENTIAL (PRIMARY) HYPERTENSION: ICD-10-CM

## 2025-06-03 DIAGNOSIS — F32.4 MAJOR DEPRESSIVE DISORDER WITH SINGLE EPISODE, IN PARTIAL REMISSION: ICD-10-CM

## 2025-06-03 RX ORDER — LEVOTHYROXINE SODIUM 150 UG/1
150 TABLET ORAL DAILY
Qty: 90 TABLET | Refills: 0 | Status: SHIPPED | OUTPATIENT
Start: 2025-06-03

## 2025-06-03 RX ORDER — FLUOXETINE HYDROCHLORIDE 40 MG/1
40 CAPSULE ORAL DAILY
Qty: 90 CAPSULE | Refills: 1 | Status: SHIPPED | OUTPATIENT
Start: 2025-06-03

## 2025-06-12 DIAGNOSIS — G89.29 OTHER CHRONIC PAIN: ICD-10-CM

## 2025-06-12 RX ORDER — GABAPENTIN 600 MG/1
600 TABLET ORAL 2 TIMES DAILY
Qty: 180 TABLET | Refills: 0 | Status: SHIPPED | OUTPATIENT
Start: 2025-06-12

## 2025-06-30 DIAGNOSIS — I10 BENIGN ESSENTIAL HTN: ICD-10-CM

## 2025-06-30 DIAGNOSIS — G89.29 OTHER CHRONIC PAIN: ICD-10-CM

## 2025-06-30 DIAGNOSIS — I48.91 ATRIAL FIBRILLATION, UNSPECIFIED TYPE (MULTI): ICD-10-CM

## 2025-07-01 RX ORDER — RIVAROXABAN 20 MG/1
20 TABLET, FILM COATED ORAL
Qty: 90 TABLET | Refills: 3 | Status: SHIPPED | OUTPATIENT
Start: 2025-07-01

## 2025-07-01 RX ORDER — GABAPENTIN 600 MG/1
600 TABLET ORAL 2 TIMES DAILY
Qty: 180 TABLET | Refills: 0 | Status: SHIPPED | OUTPATIENT
Start: 2025-07-01

## 2025-07-01 RX ORDER — TORSEMIDE 100 MG/1
50 TABLET ORAL DAILY
Qty: 45 TABLET | Refills: 0 | Status: SHIPPED | OUTPATIENT
Start: 2025-07-01

## 2025-07-01 NOTE — TELEPHONE ENCOUNTER
Requested Prescriptions     Pending Prescriptions Disp Refills    Xarelto 20 mg tablet [Pharmacy Med Name: XARELTO 20 MG TABLET] 90 tablet 3     Sig: TAKE 1 TABLET BY MOUTH EVERY EVENING WITH A MEAL

## 2025-07-01 NOTE — TELEPHONE ENCOUNTER
Prescription request received and populated   Pharmacy populated  Last Office Visit: 1/15/25 for cpe  Has upcoming ov 7/16/25 for 6 month follow up

## 2025-07-15 ENCOUNTER — APPOINTMENT (OUTPATIENT)
Dept: RADIOLOGY | Facility: HOSPITAL | Age: 70
End: 2025-07-15
Payer: MEDICARE

## 2025-07-15 ENCOUNTER — HOSPITAL ENCOUNTER (EMERGENCY)
Facility: HOSPITAL | Age: 70
Discharge: HOME | End: 2025-07-15
Attending: STUDENT IN AN ORGANIZED HEALTH CARE EDUCATION/TRAINING PROGRAM
Payer: MEDICARE

## 2025-07-15 VITALS
DIASTOLIC BLOOD PRESSURE: 72 MMHG | WEIGHT: 287.04 LBS | OXYGEN SATURATION: 96 % | HEART RATE: 58 BPM | RESPIRATION RATE: 17 BRPM | BODY MASS INDEX: 49 KG/M2 | TEMPERATURE: 98.4 F | SYSTOLIC BLOOD PRESSURE: 163 MMHG | HEIGHT: 64 IN

## 2025-07-15 DIAGNOSIS — S52.532A CLOSED COLLES' FRACTURE OF LEFT RADIUS, INITIAL ENCOUNTER: Primary | ICD-10-CM

## 2025-07-15 PROCEDURE — 73110 X-RAY EXAM OF WRIST: CPT | Mod: LEFT SIDE | Performed by: STUDENT IN AN ORGANIZED HEALTH CARE EDUCATION/TRAINING PROGRAM

## 2025-07-15 PROCEDURE — 2500000004 HC RX 250 GENERAL PHARMACY W/ HCPCS (ALT 636 FOR OP/ED): Performed by: STUDENT IN AN ORGANIZED HEALTH CARE EDUCATION/TRAINING PROGRAM

## 2025-07-15 PROCEDURE — 73030 X-RAY EXAM OF SHOULDER: CPT | Mod: LEFT SIDE | Performed by: RADIOLOGY

## 2025-07-15 PROCEDURE — 73110 X-RAY EXAM OF WRIST: CPT | Mod: LT

## 2025-07-15 PROCEDURE — 25605 CLTX DST RDL FX/EPHYS SEP W/: CPT | Mod: LT

## 2025-07-15 PROCEDURE — 73502 X-RAY EXAM HIP UNI 2-3 VIEWS: CPT | Mod: LEFT SIDE | Performed by: RADIOLOGY

## 2025-07-15 PROCEDURE — 73030 X-RAY EXAM OF SHOULDER: CPT | Mod: LT

## 2025-07-15 PROCEDURE — 99284 EMERGENCY DEPT VISIT MOD MDM: CPT | Performed by: STUDENT IN AN ORGANIZED HEALTH CARE EDUCATION/TRAINING PROGRAM

## 2025-07-15 PROCEDURE — 73502 X-RAY EXAM HIP UNI 2-3 VIEWS: CPT | Mod: LT

## 2025-07-15 RX ORDER — LIDOCAINE HYDROCHLORIDE 10 MG/ML
10 INJECTION, SOLUTION INFILTRATION; PERINEURAL ONCE
Status: COMPLETED | OUTPATIENT
Start: 2025-07-15 | End: 2025-07-15

## 2025-07-15 RX ORDER — FENTANYL CITRATE 50 UG/ML
100 INJECTION, SOLUTION INTRAMUSCULAR; INTRAVENOUS ONCE
Status: COMPLETED | OUTPATIENT
Start: 2025-07-15 | End: 2025-07-15

## 2025-07-15 RX ORDER — OXYCODONE AND ACETAMINOPHEN 5; 325 MG/1; MG/1
1 TABLET ORAL EVERY 6 HOURS PRN
Qty: 5 TABLET | Refills: 0 | Status: SHIPPED | OUTPATIENT
Start: 2025-07-15 | End: 2025-07-18

## 2025-07-15 RX ORDER — FENTANYL CITRATE 50 UG/ML
50 INJECTION, SOLUTION INTRAMUSCULAR; INTRAVENOUS ONCE
Status: COMPLETED | OUTPATIENT
Start: 2025-07-15 | End: 2025-07-15

## 2025-07-15 RX ADMIN — FENTANYL CITRATE 50 MCG: 50 INJECTION INTRAMUSCULAR; INTRAVENOUS at 13:42

## 2025-07-15 RX ADMIN — FENTANYL CITRATE 100 MCG: 50 INJECTION INTRAMUSCULAR; INTRAVENOUS at 14:53

## 2025-07-15 RX ADMIN — LIDOCAINE HYDROCHLORIDE 10 ML: 10 INJECTION, SOLUTION INFILTRATION; PERINEURAL at 14:40

## 2025-07-15 ASSESSMENT — PAIN DESCRIPTION - PAIN TYPE: TYPE: ACUTE PAIN

## 2025-07-15 ASSESSMENT — PAIN DESCRIPTION - PROGRESSION: CLINICAL_PROGRESSION: NOT CHANGED

## 2025-07-15 ASSESSMENT — PAIN SCALES - GENERAL: PAINLEVEL_OUTOF10: 8

## 2025-07-15 ASSESSMENT — PAIN - FUNCTIONAL ASSESSMENT: PAIN_FUNCTIONAL_ASSESSMENT: 0-10

## 2025-07-15 NOTE — ED PROVIDER NOTES
Emergency Department Provider Note              [facility] EMERGENCY MEDICINE  Emergency Department        Pt Name: Radha Redmond  MRN: [id number]  Birthdate 1955  Date of evaluation: [unfilled]    CHIEF COMPLAINT       Chief Complaint   Patient presents with    Fall     Pt bib ems after fall in park. Pt denies hitting head and denies Loc. Pt states left arm, shoulder, wrist, and hand pain. Pt received 50 mcg of fentanyl via ems. Pt axox4. Pt denies any other complaints at this time.        OF PRESENT ILLNESS  (Location/Symptom, Timing/Onset, Context/Setting, Quality, Duration, ModifyingFactors, Severity.)      Radha Redmond is a 70 y.o. female who presents with ground-level fall.  Patient fell on outstretched hand she was out doing archery and states that she tripped and fell on her outstretched hand on the left wrist with deformity pain to the elbow pain to the left knee.  Did not hit her head no loss of consciousness not on any blood thinners patient was placed in a air splint and brought in by EMS for evaluation.    PAST MEDICAL / SURGICAL / SOCIAL / FAMILY HISTORY      has a past medical history of Age-related nuclear cataract of both eyes, Anxiety, Arthritis, Asthma, Atrial fibrillation (Multi), Chronic kidney disease, Colon polyp, Diverticulosis, Drusen (degenerative) of macula, bilateral, Dry eye syndrome of bilateral lacrimal glands, Hernia, internal, Hyperlipidemia, Hypertension, Personal history of other diseases of the circulatory system, Personal history of other diseases of the circulatory system, Personal history of other diseases of the respiratory system, Personal history of other endocrine, nutritional and metabolic disease, Personal history of other endocrine, nutritional and metabolic disease, Personal history of other malignant neoplasm of skin, PONV (postoperative nausea and vomiting), Sleep apnea, Type 2 diabetes mellitus with other diabetic kidney complication, and  Unspecified disorder of refraction.    She has no past medical history of Personal history of irradiation.     has a past surgical history that includes Other surgical history (2021); Other surgical history (2021); Other surgical history (2021); Other surgical history (2021); Other surgical history (2021); Other surgical history (1989); Other surgical history (2021); Colonoscopy w/ biopsies (2018); and Appendectomy.    Social History     Socioeconomic History    Marital status:      Spouse name: Not on file    Number of children: Not on file    Years of education: Not on file    Highest education level: Not on file   Occupational History    Not on file   Tobacco Use    Smoking status: Former     Current packs/day: 0.00     Types: Cigarettes     Quit date: 1988     Years since quittin.5     Passive exposure: Never    Smokeless tobacco: Never   Vaping Use    Vaping status: Never Used   Substance and Sexual Activity    Alcohol use: Yes     Alcohol/week: 1.0 standard drink of alcohol     Types: 1 Glasses of wine per week     Comment: monthly    Drug use: Never    Sexual activity: Not Currently     Partners: Male     Birth control/protection: Female Sterilization   Other Topics Concern    Not on file   Social History Narrative    Not on file     Social Drivers of Health     Financial Resource Strain: Not on File (2021)    Received from [facility]    Financial Resource Strain     Financial Resource Strain: 0   Food Insecurity: Not on File (2024)    Received from [facility]    Food Insecurity     Food: 0   Transportation Needs: Not on File (2021)    Received from [facility]    Transportation Needs     Transportation: 0   Physical Activity: Not on File (2021)    Received from [facility]    Physical Activity     Physical Activity: 0   Stress: Not on File (2021)    Received from [facility]    Stress     Stress: 0   Social Connections: Not on File (2024)     Received from [facility]    Social Connections     Connectedness: 0   Intimate Partner Violence: Not on file   Housing Stability: Not on File (2021)    Received from [facility]    Housing Stability     Housin       Family History[1]    Allergies:  Aspirin, Nsaids (non-steroidal anti-inflammatory drug), Statins-hmg-coa reductase inhibitors, and Ezetimibe    Home Medications:  Prior to Admission medications    Medication Sig Start Date End Date Taking? Authorizing Provider   acetaminophen (Tylenol) 500 mg tablet Take 1 tablet (500 mg) by mouth every 4 hours.    Historical Provider, MD   albuterol (Ventolin HFA) 90 mcg/actuation inhaler 2 PUFFS AS NEEDED INHALATION EVERY 4 HRS 30 DAY(S) 24   GISELE Cruz   Arnuity Ellipta 100 mcg/actuation inhaler INHALE 1 PUFF BY MOUTH EVERY DAY 24   GISELE Cruz   cholecalciferol (Vitamin D-3) 25 MCG (1000 UT) tablet Take 1 tablet (25 mcg) by mouth once daily.    Historical Provider, MD   FLUoxetine (PROzac) 40 mg capsule TAKE 1 CAPSULE BY MOUTH ONCE DAILY 6/3/25   GISELE Cruz   fluticasone (Flonase) 50 mcg/actuation nasal spray INSTILL 2 SPRAYS IN EACH NOSTRIL EVERY DAY PLEASE MAKE AN APPOINTMENT WITH DR. BUFFY SUAREZ DAILY 30 DAYS 3/24/25   GISELE Cruz   gabapentin (Neurontin) 600 mg tablet TAKE 1 TABLET (600 MG) BY MOUTH 2 TIMES A DAY. 25   GISELE Cruz   Jardiance 10 mg Take 1 tablet (10 mg) by mouth once daily.    Historical Provider, MD   metFORMIN (Glucophage) 500 mg tablet TAKE 1 TABLET BY MOUTH TWICE A DAY WITH MEALS 25   GISELE Cruz   metoprolol succinate XL (Toprol-XL) 25 mg 24 hr tablet Take 1 tablet (25 mg) by mouth once daily. Do not crush or chew. 24   GISELE Pradhan   multivit-minerals/folic acid (WOMEN'S MULTIVITAMIN GUMMIES ORAL) Take by mouth. On hold    Historical Provider, MD   spironolactone (Aldactone) 25 mg tablet TAKE 1/2 TABLET BY MOUTH  "TWICE A DAY 4/14/25   Dee MCGARRY Svec, APRN-CNP   Synthroid 150 mcg tablet TAKE 1 TABLET (150 MCG) BY MOUTH ONCE DAILY. 6/3/25   Dee A Svec, APRN-CNP   torsemide (Demadex) 100 mg tablet TAKE 0.5 TABLETS (50 MG) BY MOUTH ONCE DAILY. 7/1/25   Dee A Svec, APRN-CNP   Xarelto 20 mg tablet TAKE 1 TABLET BY MOUTH EVERY EVENING WITH A MEAL 7/1/25   Jose Garvey,        REVIEW OF SYSTEMS    (2-9 systems for level 4, 10 or more for level 5)     Review of Systems    PHYSICAL EXAM   (up to 7 for level 4, 8 or more for level 5)     INITIAL VITALS:   /72   Pulse 58   Temp 36.9 °C (98.4 °F) (Temporal)   Resp 17   Ht 1.626 m (5' 4\")   Wt 130 kg (287 lb 0.6 oz)   LMP  (LMP Unknown)   SpO2 96%   BMI 49.27 kg/m²     Physical Exam    Left wrist: deformity of the left wrist noted, 2+ pulse,  strength is present, elbow has a small abrasion  Left knee: small abrasion to the knee  Head: atraumatic  Neck: atraumatic  Neuro: a&o x 4, GCS 15     DIAGNOSIS         (LABS / IMAGING / EKG):  Orders Placed This Encounter   Procedures    XR wrist left 3+ views    XR wrist left 3+ views    XR hip left with pelvis when performed 2 or 3 views    XR shoulder left 2+ views       MEDICATIONS ORDERED:  [unfilled]    RESULTS / EMERGENCY DEPARTMENT COURSE / MDM   :  Results for orders placed or performed in visit on 01/20/25   Comprehensive Metabolic Panel    Collection Time: 01/20/25 10:15 AM   Result Value Ref Range    Glucose 132 (H) 74 - 99 mg/dL    Sodium 141 136 - 145 mmol/L    Potassium 4.6 3.5 - 5.3 mmol/L    Chloride 101 98 - 107 mmol/L    Bicarbonate 32 21 - 32 mmol/L    Anion Gap 13 10 - 20 mmol/L    Urea Nitrogen 28 (H) 6 - 23 mg/dL    Creatinine 1.20 (H) 0.50 - 1.05 mg/dL    eGFR 49 (L) >60 mL/min/1.73m*2    Calcium 8.5 (L) 8.6 - 10.3 mg/dL    Albumin 3.8 3.4 - 5.0 g/dL    Alkaline Phosphatase 82 33 - 136 U/L    Total Protein 6.5 6.4 - 8.2 g/dL    AST 14 9 - 39 U/L    Bilirubin, Total 0.9 0.0 - 1.2 mg/dL    ALT 11 7 " - 45 U/L   Lipid Panel    Collection Time: 01/20/25 10:15 AM   Result Value Ref Range    Cholesterol 232 (H) 0 - 199 mg/dL    HDL-Cholesterol 47.5 mg/dL    Cholesterol/HDL Ratio 4.9     LDL Calculated 151 (H) <=99 mg/dL    VLDL 33 0 - 40 mg/dL    Triglycerides 167 (H) 0 - 149 mg/dL    Non HDL Cholesterol 185 (H) 0 - 149 mg/dL   Phosphorus    Collection Time: 01/20/25 10:15 AM   Result Value Ref Range    Phosphorus 4.6 2.5 - 4.9 mg/dL     *Note: Due to a large number of results and/or encounters for the requested time period, some results have not been displayed. A complete set of results can be found in Results Review.       IMPRESSION:     RADIOLOGY:      EKG:      POC ULTRASOUND:      EMERGENCY DEPARTMENT COURSE:  70-year-old female ground-level fall  Patient on my review of her x-ray shows a distal radius fracture intra-articular closed with dorsal displacement of the distal tip  She is neurovascularly intact  Patient was verbally consented for a closed reduction  I did place 10 mL 1% lidocaine as a hematoma block to the left wrist and 100 mcg of fentanyl for pain control  I then manually reduced the left wrist with improved alignment  Patient is neurovascularly intact status post the reduction  X-rays were obtained  Orthopedics consulted and is fine with an outpatient follow-up patient was placed in a sling and splint lifted in a sugar-tong splint  The postreduction x-ray shows minimal improvement of the alignment I attribute this to the comminuted fracture intra-articular although I did place a mold on the splint the alignment has not improved much precluding that she may need surgery  Will have her follow-up in 1 week with orthopedics  Did additional x-rays of the shoulder and of the hip joint disease are negative for acute fracture patient will be discharged with 3-day supply of breakthrough pain medication after checking OARRS report  Diagnoses as of 07/15/25 1740   Closed Colles' fracture of left radius,  initial encounter       PROCEDURES:      CONSULTS:  None    CRITICAL CARE:      FINAL IMPRESSION      1. Closed Colles' fracture of left radius, initial encounter          DISPOSITION / PLAN     [unfilled]    PATIENT REFERRED TO:  Tommie Silva MD  [address] [place] [address]  [address]  [place] [address]  [phone]    In 1 week        DISCHARGE MEDICATIONS:  Discharge Medication List as of 7/15/2025  4:49 PM          Rick Adamson MD  5:40 PM    Attending Emergency Physician  [facility] EMERGENCY MEDICINE    (Please note that portions of this note were completed with a voice recognition program.  Effortswere made to edit the dictations but occasionally words are mis-transcribed.)                                                                 [1]   Family History  Problem Relation Name Age of Onset    Diabetes type II Mother Linda Mathewwesley     Diabetes Mother Linda Mathewwesley     Clotting disorder Mother Linda Carmonawesley     Depression Mother Linda Kowesley     Prostate cancer Father Vasilis     Arthritis Father Vasilis     Cancer Father Vasilis     Stroke Son Mark     Aneurysm Other children     Asthma Father's Brother Ancelmo Maykel Adamson MD  07/15/25 9605

## 2025-07-16 ENCOUNTER — OFFICE VISIT (OUTPATIENT)
Dept: PRIMARY CARE | Facility: CLINIC | Age: 70
End: 2025-07-16
Payer: MEDICARE

## 2025-07-16 VITALS
WEIGHT: 271 LBS | TEMPERATURE: 95.7 F | BODY MASS INDEX: 46.52 KG/M2 | SYSTOLIC BLOOD PRESSURE: 124 MMHG | DIASTOLIC BLOOD PRESSURE: 72 MMHG | HEART RATE: 65 BPM | RESPIRATION RATE: 18 BRPM | OXYGEN SATURATION: 96 %

## 2025-07-16 DIAGNOSIS — E11.9 TYPE 2 DIABETES MELLITUS WITHOUT COMPLICATION, WITHOUT LONG-TERM CURRENT USE OF INSULIN: ICD-10-CM

## 2025-07-16 DIAGNOSIS — I48.91 ATRIAL FIBRILLATION, UNSPECIFIED TYPE (MULTI): ICD-10-CM

## 2025-07-16 DIAGNOSIS — G89.29 OTHER CHRONIC PAIN: ICD-10-CM

## 2025-07-16 DIAGNOSIS — J45.909 ASTHMA, UNSPECIFIED ASTHMA SEVERITY, UNSPECIFIED WHETHER COMPLICATED, UNSPECIFIED WHETHER PERSISTENT (HHS-HCC): ICD-10-CM

## 2025-07-16 DIAGNOSIS — I10 BENIGN ESSENTIAL HTN: ICD-10-CM

## 2025-07-16 DIAGNOSIS — F32.4 MAJOR DEPRESSIVE DISORDER WITH SINGLE EPISODE, IN PARTIAL REMISSION: ICD-10-CM

## 2025-07-16 DIAGNOSIS — Z76.0 MEDICATION REFILL: ICD-10-CM

## 2025-07-16 DIAGNOSIS — N18.31 CHRONIC KIDNEY DISEASE, STAGE 3A (MULTI): ICD-10-CM

## 2025-07-16 DIAGNOSIS — E03.9 ACQUIRED HYPOTHYROIDISM: Primary | ICD-10-CM

## 2025-07-16 DIAGNOSIS — I10 ESSENTIAL (PRIMARY) HYPERTENSION: ICD-10-CM

## 2025-07-16 LAB — POC HEMOGLOBIN A1C: 6.9 % (ref 4.2–6.5)

## 2025-07-16 PROCEDURE — 3078F DIAST BP <80 MM HG: CPT | Performed by: NURSE PRACTITIONER

## 2025-07-16 PROCEDURE — G0444 DEPRESSION SCREEN ANNUAL: HCPCS | Performed by: NURSE PRACTITIONER

## 2025-07-16 PROCEDURE — 1125F AMNT PAIN NOTED PAIN PRSNT: CPT | Performed by: NURSE PRACTITIONER

## 2025-07-16 PROCEDURE — 1036F TOBACCO NON-USER: CPT | Performed by: NURSE PRACTITIONER

## 2025-07-16 PROCEDURE — G2211 COMPLEX E/M VISIT ADD ON: HCPCS | Performed by: NURSE PRACTITIONER

## 2025-07-16 PROCEDURE — 83036 HEMOGLOBIN GLYCOSYLATED A1C: CPT | Mod: QW | Performed by: NURSE PRACTITIONER

## 2025-07-16 PROCEDURE — 1159F MED LIST DOCD IN RCRD: CPT | Performed by: NURSE PRACTITIONER

## 2025-07-16 PROCEDURE — 1123F ACP DISCUSS/DSCN MKR DOCD: CPT | Performed by: NURSE PRACTITIONER

## 2025-07-16 PROCEDURE — 1160F RVW MEDS BY RX/DR IN RCRD: CPT | Performed by: NURSE PRACTITIONER

## 2025-07-16 PROCEDURE — 1126F AMNT PAIN NOTED NONE PRSNT: CPT | Performed by: NURSE PRACTITIONER

## 2025-07-16 PROCEDURE — 3074F SYST BP LT 130 MM HG: CPT | Performed by: NURSE PRACTITIONER

## 2025-07-16 PROCEDURE — 99214 OFFICE O/P EST MOD 30 MIN: CPT | Performed by: NURSE PRACTITIONER

## 2025-07-16 PROCEDURE — 3044F HG A1C LEVEL LT 7.0%: CPT | Performed by: NURSE PRACTITIONER

## 2025-07-16 RX ORDER — FLUTICASONE FUROATE 100 UG/1
1 POWDER RESPIRATORY (INHALATION) DAILY
Qty: 30 EACH | Refills: 2 | Status: SHIPPED | OUTPATIENT
Start: 2025-07-16

## 2025-07-16 RX ORDER — METOPROLOL SUCCINATE 25 MG/1
25 TABLET, EXTENDED RELEASE ORAL DAILY
Qty: 90 TABLET | Refills: 1 | Status: SHIPPED | OUTPATIENT
Start: 2025-07-16

## 2025-07-16 RX ORDER — LEVOTHYROXINE SODIUM 150 UG/1
150 TABLET ORAL DAILY
Qty: 90 TABLET | Refills: 1 | Status: SHIPPED | OUTPATIENT
Start: 2025-07-16

## 2025-07-16 RX ORDER — GABAPENTIN 600 MG/1
600 TABLET ORAL 2 TIMES DAILY
Qty: 180 TABLET | Refills: 1 | Status: SHIPPED | OUTPATIENT
Start: 2025-07-16

## 2025-07-16 RX ORDER — ALBUTEROL SULFATE 90 UG/1
INHALANT RESPIRATORY (INHALATION)
Qty: 1 G | Refills: 5 | Status: SHIPPED | OUTPATIENT
Start: 2025-07-16

## 2025-07-16 RX ORDER — METFORMIN HYDROCHLORIDE 500 MG/1
500 TABLET ORAL
Qty: 180 TABLET | Refills: 1 | Status: SHIPPED | OUTPATIENT
Start: 2025-07-16

## 2025-07-16 RX ORDER — FLUOXETINE HYDROCHLORIDE 40 MG/1
40 CAPSULE ORAL DAILY
Qty: 90 CAPSULE | Refills: 1 | Status: SHIPPED | OUTPATIENT
Start: 2025-07-16

## 2025-07-16 RX ORDER — TORSEMIDE 100 MG/1
50 TABLET ORAL DAILY
Qty: 45 TABLET | Refills: 1 | Status: SHIPPED | OUTPATIENT
Start: 2025-07-16

## 2025-07-16 RX ORDER — SPIRONOLACTONE 25 MG/1
12.5 TABLET ORAL 2 TIMES DAILY
Qty: 90 TABLET | Refills: 1 | Status: SHIPPED | OUTPATIENT
Start: 2025-07-16

## 2025-07-16 RX ORDER — FLUTICASONE PROPIONATE 50 MCG
1 SPRAY, SUSPENSION (ML) NASAL DAILY
Qty: 48 ML | Refills: 1 | Status: SHIPPED | OUTPATIENT
Start: 2025-07-16 | End: 2025-10-14

## 2025-07-16 RX ORDER — EMPAGLIFLOZIN 10 MG/1
10 TABLET, FILM COATED ORAL DAILY
Qty: 90 TABLET | Refills: 1 | Status: SHIPPED | OUTPATIENT
Start: 2025-07-16

## 2025-07-16 ASSESSMENT — PATIENT HEALTH QUESTIONNAIRE - PHQ9
2. FEELING DOWN, DEPRESSED OR HOPELESS: NOT AT ALL
1. LITTLE INTEREST OR PLEASURE IN DOING THINGS: NOT AT ALL
SUM OF ALL RESPONSES TO PHQ9 QUESTIONS 1 AND 2: 0

## 2025-07-16 ASSESSMENT — ENCOUNTER SYMPTOMS
CONSTITUTIONAL NEGATIVE: 1
GASTROINTESTINAL NEGATIVE: 1
OCCASIONAL FEELINGS OF UNSTEADINESS: 0
MUSCULOSKELETAL NEGATIVE: 1
LOSS OF SENSATION IN FEET: 0
RESPIRATORY NEGATIVE: 1
DEPRESSION: 0
CARDIOVASCULAR NEGATIVE: 1

## 2025-07-16 ASSESSMENT — PAIN SCALES - GENERAL: PAINLEVEL_OUTOF10: 4

## 2025-07-16 NOTE — PROGRESS NOTES
"Chief Complaint  Radha Redmond is a 70 y.o. female presenting for \"Follow-up (6 month follow up/Needs refills).\"    HPI     Radha Redmond is a 70 y.o. female presenting for a six month fall, yesterday she was walking at an event and fell, broke her left wrist.      A1C today is good, needs refills and labs.        Past Medical History  Patient Active Problem List    Diagnosis Date Noted   • Chronic atrial fibrillation (Multi) 09/30/2024   • Nonrheumatic aortic valve regurgitation 05/04/2025   • Irritation of both eyes 10/30/2024   • Squamous blepharitis of upper and lower eyelids of both eyes 10/30/2024   • Drusen (degenerative) of macula, bilateral 04/26/2024   • Unspecified disorder of refraction 09/06/2023   • Type 2 diabetes mellitus with other specified complication, without long-term current use of insulin 09/06/2023   • Dry eyes 09/06/2023   • Shoulder pain 09/06/2023   • Palpitations 09/06/2023   • Other chronic pain 09/06/2023   • Osteoarthritis of left knee 09/06/2023   • Combined forms of age-related cataract of both eyes 09/06/2023   • Neuropathy 09/06/2023   • Tongue lesion 09/06/2023   • Nasal polyps 09/06/2023   • Muscle spasm 09/06/2023   • Severe obesity (BMI >= 40) (Multi) 09/06/2023   • Mild intermittent asthma without complication ([facility]) 09/06/2023   • Major depressive disorder, single episode, unspecified 09/06/2023   • Loud snoring 09/06/2023   • Iron deficiency anemia 09/06/2023   • Nodular thyroid disease 09/06/2023   • Hypothyroidism 09/06/2023   • Hoarseness 09/06/2023   • History of basal cell carcinoma 09/06/2023   • History of low potassium 09/06/2023   • History of adenomatous polyp of colon 09/06/2023   • Heart failure 09/06/2023   • Hypertensive disorder 09/06/2023   • Epistaxis 09/06/2023   • Diverticulosis large intestine w/o perforation or abscess w/o bleeding 09/06/2023   • Daytime somnolence 09/06/2023   • Anxiety disorder, unspecified 09/06/2023   • Angina at rest " 09/06/2023   • Anemia due to acute blood loss 09/06/2023   • Obstructive sleep apnea syndrome 09/06/2023        Medications  Current Outpatient Medications   Medication Instructions   • acetaminophen (TYLENOL) 500 mg, Every 4 hours   • albuterol (Ventolin HFA) 90 mcg/actuation inhaler 2 PUFFS AS NEEDED INHALATION EVERY 4 HRS 30 DAY(S)   • cholecalciferol (VITAMIN D-3) 25 mcg, Daily   • FLUoxetine (PROZAC) 40 mg, oral, Daily   • fluticasone (Flonase) 50 mcg/actuation nasal spray 1 spray, Each Nostril, Daily, Shake gently. Before first use, prime pump. After use, clean tip and replace cap.   • fluticasone furoate (Arnuity Ellipta) 100 mcg/actuation inhaler 1 puff, inhalation, Daily   • gabapentin (NEURONTIN) 600 mg, oral, 2 times daily   • Jardiance 10 mg, oral, Daily   • levothyroxine (SYNTHROID) 150 mcg, oral, Daily   • metFORMIN (GLUCOPHAGE) 500 mg, oral, 2 times daily (morning and late afternoon)   • metoprolol succinate XL (TOPROL-XL) 25 mg, oral, Daily, Do not crush or chew.   • multivit-minerals/folic acid (WOMEN'S MULTIVITAMIN GUMMIES ORAL) Take by mouth. On hold   • oxyCODONE-acetaminophen (Percocet) 5-325 mg tablet 1 tablet, oral, Every 6 hours PRN   • rivaroxaban (XARELTO) 20 mg, oral, Daily with evening meal   • spironolactone (ALDACTONE) 12.5 mg, oral, 2 times daily   • torsemide (DEMADEX) 50 mg, oral, Daily        Surgical History  She has a past surgical history that includes Other surgical history (01/27/2021); Other surgical history (01/27/2021); Other surgical history (01/27/2021); Other surgical history (01/27/2021); Other surgical history (07/12/2021); Other surgical history (2/1989); Other surgical history (07/12/2021); Colonoscopy w/ biopsies (01/17/2018); Appendectomy; Fracture surgery (2018); Hernia repair (1989); Joint replacement (2012 AND 2023); Sinus surgery (1996); and Tubal ligation (1989).     Social History  She reports that she quit smoking about 37 years ago. Her smoking use included  cigarettes. She has never been exposed to tobacco smoke. She has never used smokeless tobacco. She reports current alcohol use of about 1.0 standard drink of alcohol per week. She reports that she does not use drugs.    Family History  Family History[1]     Allergies  Aspirin, Nsaids (non-steroidal anti-inflammatory drug), Statins-hmg-coa reductase inhibitors, and Ezetimibe    ROS  Review of Systems   Constitutional: Negative.    Respiratory: Negative.     Cardiovascular: Negative.    Gastrointestinal: Negative.    Genitourinary: Negative.    Musculoskeletal: Negative.         Last Recorded Vitals  /72 (BP Location: Right arm, Patient Position: Sitting, BP Cuff Size: Adult)   Pulse 65   Temp 35.4 °C (95.7 °F)   Resp 18   Wt 123 kg (271 lb)   SpO2 96%     Physical Exam  Vitals and nursing note reviewed.   Constitutional:       Appearance: Normal appearance.     Eyes:      Pupils: Pupils are equal, round, and reactive to light.       Cardiovascular:      Rate and Rhythm: Normal rate and regular rhythm.      Pulses: Normal pulses.      Heart sounds: Normal heart sounds.   Pulmonary:      Effort: Pulmonary effort is normal.      Breath sounds: Normal breath sounds.     Neurological:      Mental Status: She is alert.         Relevant Results      Assessment/Plan   Radha was seen today for follow-up.  Diagnoses and all orders for this visit:  Acquired hypothyroidism (Primary)  -     TSH with reflex to Free T4 if abnormal; Future  -     TSH with reflex to Free T4 if abnormal  Asthma, unspecified asthma severity, unspecified whether complicated, unspecified whether persistent ([facility])  -     albuterol (Ventolin HFA) 90 mcg/actuation inhaler; 2 PUFFS AS NEEDED INHALATION EVERY 4 HRS 30 DAY(S)  -     fluticasone furoate (Arnuity Ellipta) 100 mcg/actuation inhaler; Inhale 1 puff once daily.  Atrial fibrillation, unspecified type (Multi)  -     metoprolol succinate XL (Toprol-XL) 25 mg 24 hr tablet; Take 1 tablet  (25 mg) by mouth once daily. Do not crush or chew.  -     rivaroxaban (Xarelto) 20 mg tablet; Take 1 tablet (20 mg) by mouth once daily in the evening. Take with meals.  Benign essential HTN  -     torsemide (Demadex) 100 mg tablet; Take 0.5 tablets (50 mg) by mouth once daily.  Essential (primary) hypertension  -     Comprehensive Metabolic Panel; Future  -     Lipid Panel; Future  -     fluticasone (Flonase) 50 mcg/actuation nasal spray; Administer 1 spray into each nostril once daily. Shake gently. Before first use, prime pump. After use, clean tip and replace cap.  -     levothyroxine (Synthroid) 150 mcg tablet; Take 1 tablet (150 mcg) by mouth once daily.  -     Comprehensive Metabolic Panel  -     Lipid Panel  Major depressive disorder with single episode, in partial remission  -     FLUoxetine (PROzac) 40 mg capsule; Take 1 capsule (40 mg) by mouth once daily.  Medication refill  -     spironolactone (Aldactone) 25 mg tablet; Take 0.5 tablets (12.5 mg) by mouth 2 times a day.  Other chronic pain  -     gabapentin (Neurontin) 600 mg tablet; Take 1 tablet (600 mg) by mouth 2 times a day.  Type 2 diabetes mellitus without complication, without long-term current use of insulin  -     Lipid Panel; Future  -     POCT glycosylated hemoglobin (Hb A1C) manually resulted  -     metFORMIN (Glucophage) 500 mg tablet; Take 1 tablet (500 mg) by mouth 2 times daily (morning and late afternoon).  -     Lipid Panel  -     Albumin-Creatinine Ratio, Urine Random; Future  -     Jardiance 10 mg tablet; Take 1 tablet (10 mg) by mouth once daily.          COUNSELING      Medication education:              Education:  The patient is counseled regarding potential side-effects of any and all new medications             Understanding:  Patient expressed understanding             Adherence:  No barriers to adherence identified        Dee Choudhary, APRN-CNP                [1]  Family History  Problem Relation Name Age of Onset   •  Diabetes type II Mother Linda Hicks    • Diabetes Mother Linda Hicks    • Clotting disorder Mother Linda Hicks    • Depression Mother Linda Hicks    • Prostate cancer Father Vasilis    • Arthritis Father Vasilis    • Cancer Father Vasilis    • Stroke Son Mark    • Aneurysm Other children    • Asthma Father's Brother Ancelmo Brar    • Accidental death Other BROTHER 20 - 29   • Miscarriages / Stillbirths Other Marousa 20 - 29

## 2025-07-17 LAB
ALBUMIN/CREAT UR: 3 MG/G CREAT
CREAT UR-MCNC: 80 MG/DL (ref 20–275)
MICROALBUMIN UR-MCNC: 0.2 MG/DL

## 2025-07-29 LAB
ALBUMIN SERPL-MCNC: 4 G/DL (ref 3.6–5.1)
ALP SERPL-CCNC: 79 U/L (ref 37–153)
ALT SERPL-CCNC: 12 U/L (ref 6–29)
ANION GAP SERPL CALCULATED.4IONS-SCNC: 10 MMOL/L (CALC) (ref 7–17)
AST SERPL-CCNC: 15 U/L (ref 10–35)
BILIRUB SERPL-MCNC: 1 MG/DL (ref 0.2–1.2)
BUN SERPL-MCNC: 30 MG/DL (ref 7–25)
CALCIUM SERPL-MCNC: 8.5 MG/DL (ref 8.6–10.4)
CHLORIDE SERPL-SCNC: 99 MMOL/L (ref 98–110)
CHOLEST SERPL-MCNC: 219 MG/DL
CHOLEST/HDLC SERPL: 4.3 (CALC)
CO2 SERPL-SCNC: 30 MMOL/L (ref 20–32)
CREAT SERPL-MCNC: 1.17 MG/DL (ref 0.6–1)
EGFRCR SERPLBLD CKD-EPI 2021: 50 ML/MIN/1.73M2
GLUCOSE SERPL-MCNC: 139 MG/DL (ref 65–99)
HDLC SERPL-MCNC: 51 MG/DL
LDLC SERPL CALC-MCNC: 131 MG/DL (CALC)
NONHDLC SERPL-MCNC: 168 MG/DL (CALC)
POTASSIUM SERPL-SCNC: 4.6 MMOL/L (ref 3.5–5.3)
PROT SERPL-MCNC: 6.7 G/DL (ref 6.1–8.1)
SODIUM SERPL-SCNC: 139 MMOL/L (ref 135–146)
TRIGL SERPL-MCNC: 227 MG/DL
TSH SERPL-ACNC: 2.23 MIU/L (ref 0.4–4.5)

## 2025-08-12 PROBLEM — T46.6X5A STATIN MYOPATHY: Status: ACTIVE | Noted: 2025-08-12

## 2025-08-12 PROBLEM — G72.0 STATIN MYOPATHY: Status: ACTIVE | Noted: 2025-08-12

## 2025-09-03 ENCOUNTER — OFFICE VISIT (OUTPATIENT)
Dept: OPHTHALMOLOGY | Facility: CLINIC | Age: 70
End: 2025-09-03
Payer: MEDICARE

## 2025-09-03 DIAGNOSIS — H35.363 DRUSEN (DEGENERATIVE) OF MACULA, BILATERAL: ICD-10-CM

## 2025-09-03 DIAGNOSIS — H25.813 COMBINED FORMS OF AGE-RELATED CATARACT OF BOTH EYES: ICD-10-CM

## 2025-09-03 DIAGNOSIS — E11.69 TYPE 2 DIABETES MELLITUS WITH OTHER SPECIFIED COMPLICATION, WITHOUT LONG-TERM CURRENT USE OF INSULIN: Primary | ICD-10-CM

## 2025-09-03 DIAGNOSIS — H52.7 UNSPECIFIED DISORDER OF REFRACTION: ICD-10-CM

## 2025-09-03 DIAGNOSIS — H04.123 DRY EYES: ICD-10-CM

## 2025-09-03 PROCEDURE — 92015 DETERMINE REFRACTIVE STATE: CPT | Performed by: OPHTHALMOLOGY

## 2025-09-03 PROCEDURE — 99214 OFFICE O/P EST MOD 30 MIN: CPT | Performed by: OPHTHALMOLOGY

## 2025-09-03 PROCEDURE — 92134 CPTRZ OPH DX IMG PST SGM RTA: CPT | Performed by: OPHTHALMOLOGY

## 2025-09-03 ASSESSMENT — ENCOUNTER SYMPTOMS
CARDIOVASCULAR NEGATIVE: 0
HEMATOLOGIC/LYMPHATIC NEGATIVE: 0
NEUROLOGICAL NEGATIVE: 0
PSYCHIATRIC NEGATIVE: 0
GASTROINTESTINAL NEGATIVE: 0
RESPIRATORY NEGATIVE: 0
ALLERGIC/IMMUNOLOGIC NEGATIVE: 0
MUSCULOSKELETAL NEGATIVE: 0
ENDOCRINE NEGATIVE: 0
EYES NEGATIVE: 0
CONSTITUTIONAL NEGATIVE: 0

## 2025-09-03 ASSESSMENT — VISUAL ACUITY
OD_SC: 20/40
METHOD_MR: PD 66
OS_SC+: +1
METHOD: SNELLEN - LINEAR
OD_SC+: -2
OS_SC: 20/30

## 2025-09-03 ASSESSMENT — REFRACTION_MANIFEST
OD_AXIS: 180
OS_ADD: +2.75
OD_CYLINDER: -0.25
OD_SPHERE: +1.75
OD_AXIS: 090
OS_SPHERE: +0.75
OD_SPHERE: +1.25
OS_CYLINDER: SPHERE
OD_ADD: +2.75
METHOD_AUTOREFRACTION: 1
OS_CYLINDER: SPHERE
OD_CYLINDER: -0.25
OS_SPHERE: +0.75

## 2025-09-03 ASSESSMENT — CUP TO DISC RATIO
OD_RATIO: 0.2
OS_RATIO: 0.2

## 2025-09-03 ASSESSMENT — TONOMETRY
OS_IOP_MMHG: 18
OD_IOP_MMHG: 18
IOP_METHOD: GOLDMANN APPLANATION

## 2025-09-03 ASSESSMENT — REFRACTION_WEARINGRX
OS_AXIS: 078
SPECS_TYPE: READERS
OS_SPHERE: +2.50
OS_CYLINDER: -0.50
OD_SPHERE: +3.00

## 2025-09-03 ASSESSMENT — EXTERNAL EXAM - LEFT EYE: OS_EXAM: NORMAL

## 2025-09-03 ASSESSMENT — PAIN SCALES - GENERAL: PAINLEVEL_OUTOF10: 0-NO PAIN

## 2025-09-03 ASSESSMENT — EXTERNAL EXAM - RIGHT EYE: OD_EXAM: NORMAL

## 2025-09-03 ASSESSMENT — SLIT LAMP EXAM - LIDS
COMMENTS: DERMATOCHALASIS - UPPER LID
COMMENTS: DERMATOCHALASIS - UPPER LID

## 2026-09-14 ENCOUNTER — APPOINTMENT (OUTPATIENT)
Dept: OPHTHALMOLOGY | Facility: CLINIC | Age: 71
End: 2026-09-14
Payer: MEDICARE